# Patient Record
Sex: FEMALE | Race: WHITE | Employment: OTHER | URBAN - METROPOLITAN AREA
[De-identification: names, ages, dates, MRNs, and addresses within clinical notes are randomized per-mention and may not be internally consistent; named-entity substitution may affect disease eponyms.]

---

## 2017-01-11 ENCOUNTER — OFFICE VISIT (OUTPATIENT)
Dept: FAMILY MEDICINE | Facility: CLINIC | Age: 47
End: 2017-01-11

## 2017-01-11 VITALS
DIASTOLIC BLOOD PRESSURE: 68 MMHG | SYSTOLIC BLOOD PRESSURE: 100 MMHG | TEMPERATURE: 98.9 F | BODY MASS INDEX: 19.64 KG/M2 | HEART RATE: 90 BPM | OXYGEN SATURATION: 98 % | WEIGHT: 134 LBS

## 2017-01-11 DIAGNOSIS — J06.9 VIRAL URI: ICD-10-CM

## 2017-01-11 DIAGNOSIS — E53.8 VITAMIN B12 DEFICIENCY (NON ANEMIC): ICD-10-CM

## 2017-01-11 DIAGNOSIS — R53.83 OTHER FATIGUE: ICD-10-CM

## 2017-01-11 DIAGNOSIS — F33.1 MODERATE EPISODE OF RECURRENT MAJOR DEPRESSIVE DISORDER (H): Primary | ICD-10-CM

## 2017-01-11 PROCEDURE — 99214 OFFICE O/P EST MOD 30 MIN: CPT | Performed by: FAMILY MEDICINE

## 2017-01-11 PROCEDURE — 36415 COLL VENOUS BLD VENIPUNCTURE: CPT | Performed by: FAMILY MEDICINE

## 2017-01-11 RX ORDER — CYANOCOBALAMIN 1000 UG/ML
1 INJECTION, SOLUTION INTRAMUSCULAR; SUBCUTANEOUS
Qty: 1 ML | Refills: 11 | OUTPATIENT
Start: 2017-01-11 | End: 2018-01-11

## 2017-01-11 NOTE — PROGRESS NOTES
"Upper respiratory infection  Sick 7 days  Throat pain was initially the biggest problem  gone now  Lost voice,Which happens fairly often when she, It's been gone  She lost her voice which happens frequently with her cold.  It's been hoarse for about a week now.  Slowly getting better she had ear pain on the LEFT which now feels resolved.  No problems on the RIGHT ear she feels that she is not getting better and is about the same as she has been.  She does not feel she is getting worse she does have an inhaler that she's used previously that his albuterol and she's been using that.  It does not seem to be making any difference she is not short of breath    Work-\" A little of this and that\" not around kids and has not been around other sick peo    Ros  fatigue   no fever  pulm has no sob  Nasal green snot thick  Eyes have been crusty      Vitamin supplementation.  She has questions about whether she receive vitamin B12 here    She's been receiving injectable vitamins on a weekly basis at her health spa on a regular basis she tells me for several weeks if not longer.  She has been getting vitamin B12 is one of those and injections and she feels that since she started getting the injection she's had a significant improvement in her mood lability and general ability to manage stressful things.  She expected her energy to improve and it did not.  She would like to start B12 injections on her own.  As she mentioned she mentioned also that she's been getting injectable vitamin C and injectable vitamin D       Depression.  Currently on medications for this    She states that her depression at this point is \"fine\"  She does not want to discuss this today.    Past Medical History   Diagnosis Date     Anxiety      Major depressive disorder, recurrent episode, unspecified      Gastro-oesophageal reflux disease      Past Surgical History   Procedure Laterality Date     Cholecystectomy       Abdomen surgery       tumwaleska sapp     " Breast surgery       augmentation     Tonsillectomy       Laparoscopic hysterectomy supracervical  6/4/2012     Procedure:LAPAROSCOPIC HYSTERECTOMY SUPRACERVICAL; Laparoscopic Supracervical Hysterectomy, revision of naval; Surgeon:GUILHERME NAVAS; Location: OR     Current Outpatient Prescriptions   Medication     cyanocobalamin (VITAMIN B12) 1000 MCG/ML injection     ALPRAZolam (XANAX) 1 MG tablet     LANsoprazole (PREVACID) 15 MG CR capsule     traZODone (DESYREL) 50 MG tablet     traZODone (DESYREL) 100 MG tablet     ALBUTEROL 108 (90 BASE) MCG/ACT inhaler     lidocaine-prilocaine (EMLA) cream     amphetamine-dextroamphetamine (ADDERALL) 10 MG tablet     albuterol (PROAIR HFA, PROVENTIL HFA, VENTOLIN HFA) 108 (90 BASE) MCG/ACT inhaler     ondansetron (ZOFRAN ODT) 4 MG disintegrating tablet     VENTOLIN  (90 BASE) MCG/ACT inhaler     No current facility-administered medications for this visit.     /68 mmHg  Pulse 90  Temp(Src) 98.9  F (37.2  C) (Oral)  Wt 60.782 kg (134 lb)  SpO2 98%  LMP 06/04/2012  Slender woman wearing extremely large sunglasses.  She states that her eyes are sensitive to light all the time.  Eye examination shows mild conjunctival erythema and no exudate no mattering on her lashes.  Pupils are equal round react to light extraocular movements are intact ear examination shows that her LEFT tympanic membrane is possibly distended it is translucent and there is no obvious air-fluid level there certainly is no infection seen.  Compared to her RIGHT tympanic membrane which is slightly dull they are different in appearance landmarks are normal in both ears.  Oropharynx is unremarkable no exudate nasal passages show her nasal mucosa to be mildly red there is no discharge or mucus seen.  Neck is supple without adenopathy breath sounds are clear throughout both lung fields there are no audible wheezing with forced expiration cardiac exam is regular mood and affect she's somewhat  reserved/  or defensive.         ASSESSMENT / PLAN      (J06.9,  B97.89) Viral URI  At this point I don't see any reason to give her an antibiotic.  I did suggest to her that she use nasal irrigation on a regular basis for the next week or so while she is improving.  If she's not getting any better over the next three days I told her that we would give her amoxicillin if she called in requested.  500 mg 3 times a day ×10 days.  I encouraged her to wait as I expect her to gradually get better          (F33.1) Moderate episode of recurrent major depressive disorder (H)  (primary encounter diagnosis)  I believe medications are provided through our office she will come back and discuss her mood with Dr. Lino in the future    (E53.8) Vitamin B12 deficiency (non anemic)???  She has been receiving vitamin B at a health spa on a weekly basis.  She is unable to tell me how much or what type of vitamin   B.  I told her that I'm happy to give her a vitamin B12 injection today but that I think it would be worth having more of a discussion with her regular physician about whether this makes sense on an ongoing basis.  She is pretty adamant that it's been very helpful for mood lability I don't really see a big downside to  keeping her on it but I thought that it would be best if she discussed this with Dr. Lino.  The vitamin B12 level will be checked today and she will return in the future to discuss this with Dr. Lino    Plan: Vitamin B12 and Folate ([facility]),         cyanocobalamin (VITAMIN B12) 1000 MCG/ML         injection, VITAMIN B12 INJ /1000MCG  (** ADD         QUANTITY **), VENOUS COLLECTION          (R53.83) Other fatigue  Multiple possibilities no other labs were run besides the B12 and folate today think she should discuss this with Dr. Lino      >25 minutes spent with patient, greater than 50% spent on discussion/education/planning - as outlined in assessment and plan   Elmo Aranda MD

## 2017-01-11 NOTE — Clinical Note
[facility] [address] [place] [address] [phone]      January 11, 2017      Estelle Gomez  [address]  [facility] [address]              Estelle was in my office today and was ill.             Sincerely,    Jean Aranda M.D.

## 2017-01-11 NOTE — MR AVS SNAPSHOT
"              After Visit Summary   1/11/2017    Estelle Gomez    MRN: [id number]           Patient Information     Date Of Birth          1970        Visit Information        Provider Department      1/11/2017 10:00 AM Jean Villanueva MD [facility]        Today's Diagnoses     Moderate episode of recurrent major depressive disorder (H)    -  1     Vitamin B12 deficiency (non anemic)         Other fatigue         Viral URI           Care Instructions    NASAL IRRIGATION    Helpful when you have a cold to get the mucous out of your nose to help relieve symptoms and prevent sinus infections.    Options     1. Cannister of aerosolized saline. Buy at pharmacy, \"Simply saline\" or generic     2. Nasal saline squeeze bottle. Buy at pharmacy. \"[place] [employer]\"     3. [employer] - is a KIT for nasal irrigation. Useful if CHRONIC sinus issues. Kit  has instructions in it.   Viral Upper Respiratory Illness (Adult)  You have a viral upper respiratory illness (URI), which is another term for the common cold. This illness is contagious during the first few days. It is spread through the air by coughing and sneezing. It may also be spread by direct contact (touching the sick person and then touching your own eyes, nose, or mouth). Frequent handwashing will decrease risk of spread. Most viral illnesses go away within 7 to 10 days with rest and simple home remedies. Sometimes the illness may last for several weeks. Antibiotics will not kill a virus, and they are generally not prescribed for this condition.    Home care    If symptoms are severe, rest at home for the first 2 to 3 days. When you resume activity, don't let yourself get too tired.    Avoid being exposed to cigarette smoke (yours or others ).    You may use acetaminophen or ibuprofen to control pain and fever, unless another medicine was prescribed. (Note: If you have chronic liver or kidney disease, have ever had a stomach ulcer or " gastrointestinal bleeding, or are taking blood-thinning medicines, talk with your healthcare provider before using these medicines.) Aspirin should never be given to anyone under 18 years of age who is ill with a viral infection or fever. It may cause severe liver or brain damage.    Your appetite may be poor, so a light diet is fine. Avoid dehydration by drinking 6 to 8 glasses of fluids per day (water, soft drinks, juices, tea, or soup). Extra fluids will help loosen secretions in the nose and lungs.    Over-the-counter cold medicines will not shorten the length of time you re sick, but they may be helpful for the following symptoms: cough, sore throat, and nasal and sinus congestion. (Note: Do not use decongestants if you have high blood pressure.)  Follow-up care  Follow up with your healthcare provider, or as advised.  When to seek medical advice  Call your healthcare provider right away if any of these occur:    Cough with lots of colored sputum (mucus)    Severe headache; face, neck, or ear pain    Difficulty swallowing due to throat pain    Fever of 100.4 F (38 C)  Call 911, or get immediate medical care  Call emergency services right away if any of these occur:    Chest pain, shortness of breath, wheezing, or difficulty breathing    Coughing up blood    Inability to swallow due to throat pain    3318-5852 [employer]. [address] [place] [address]. All rights reserved. This information is not intended as a substitute for professional medical care. Always follow your healthcare professional's instructions.              Follow-ups after your visit        Who to contact     If you have questions or need follow up information about today's clinic visit or your schedule please contact [facility] directly at [phone].  Normal or non-critical lab and imaging results will be communicated to you by MyChart, letter or phone within 4 business days after the clinic has  "received the results. If you do not hear from us within 7 days, please contact the clinic through [employer] or phone. If you have a critical or abnormal lab result, we will notify you by phone as soon as possible.  Submit refill requests through [employer] or call your pharmacy and they will forward the refill request to us. Please allow 3 business days for your refill to be completed.          Additional Information About Your Visit        en-Gauge Information     [employer]auge lets you send messages to your doctor, view your test results, renew your prescriptions, schedule appointments and more. To sign up, go to [email or web address] . Click on \"Log in\" on the left side of the screen, which will take you to the Welcome page. Then click on \"Sign up Now\" on the right side of the page.     You will be asked to enter the access code listed below, as well as some personal information. Please follow the directions to create your username and password.     Your access code is: [email or web address]  Expires: 2017 10:33 AM     Your access code will  in 90 days. If you need help or a new code, please call your [facility] clinic or [phone].        Care EveryWhere ID     This is your Care EveryWhere ID. This could be used by other organizations to access your [facility] medical records  [id number]        Your Vitals Were     Pulse Temperature Pulse Oximetry Last Period          90 98.9  F (37.2  C) (Oral) 98% 2012         Blood Pressure from Last 3 Encounters:   17 100/68   16 92/62   16 115/70    Weight from Last 3 Encounters:   17 60.782 kg (134 lb)   16 60.782 kg (134 lb)   16 62.596 kg (138 lb)              We Performed the Following     Vitamin B12 and Folate ([facility])          Today's Medication Changes          These changes are accurate as of: 17 10:33 AM.  If you have any questions, ask your nurse or doctor.               Start taking these medicines.        Dose/Directions "    cyanocobalamin 1000 MCG/ML injection   Commonly known as:  VITAMIN B12   Used for:  Vitamin B12 deficiency (non anemic)   Started by:  Jean Villanueva MD        Dose:  1 mL   Inject 1 mL (1,000 mcg) into the muscle every 30 days   Quantity:  1 mL   Refills:  11            Where to get your medicines      Some of these will need a paper prescription and others can be bought over the counter.  Ask your nurse if you have questions.     You don't need a prescription for these medications    - cyanocobalamin 1000 MCG/ML injection             Primary Care Provider Office Phone # Fax #    Brien Lino [phone]       [facility] [address]  [facility] [address]        Thank you!     Thank you for choosing [facility]  for your care. Our goal is always to provide you with excellent care. Hearing back from our patients is one way we can continue to improve our services. Please take a few minutes to complete the written survey that you may receive in the mail after your visit with us. Thank you!             Your Updated Medication List - Protect others around you: Learn how to safely use, store and throw away your medicines at www.disposemymeds.org.          This list is accurate as of: 1/11/17 10:33 AM.  Always use your most recent med list.                   Brand Name Dispense Instructions for use    * albuterol 108 (90 BASE) MCG/ACT Inhaler    PROAIR HFA/PROVENTIL HFA/VENTOLIN HFA    3 Inhaler    Inhale 2 puffs into the lungs every 6 hours as needed for shortness of breath / dyspnea or wheezing       * VENTOLIN  (90 BASE) MCG/ACT Inhaler   Generic drug:  albuterol     18 g    INHALE TWO PUFFS BY MOUTH EVERY SIX HOURS AS NEEDED FOR SHORTNESS OF BREATH       * albuterol 108 (90 BASE) MCG/ACT Inhaler   Generic drug:  albuterol     8.5 Inhaler    INHALE 2 PUFFS BY MOUTH EVERY 6 HOURS AS NEEDED FOR SHORTNESS OF BREATH/DYSPNEA       ALPRAZolam 1 MG tablet    XANAX     120 tablet    TAKE 1 TABLET BY MOUTH 4 TIMES A DAY AS NEEDED       amphetamine-dextroamphetamine 10 MG per tablet    ADDERALL    90 tablet    Take 1 tablet (10 mg) by mouth 3 times daily       cyanocobalamin 1000 MCG/ML injection    VITAMIN B12    1 mL    Inject 1 mL (1,000 mcg) into the muscle every 30 days       LANsoprazole 15 MG CR capsule    PREVACID    180 capsule    TAKE ONE CAPSULE BY MOUTH TWICE DAILY, 30-60 MINUTES BEFORE MEALS       lidocaine-prilocaine cream    EMLA    30 g    Apply to affected area  as needed prior to treatment       ondansetron 4 MG ODT tab    ZOFRAN ODT    60 tablet    Take 1-2 tablets (4-8 mg) by mouth every 8 hours as needed for nausea       * traZODone 50 MG tablet    DESYREL    90 tablet    TAKE ONE TABLET BY MOUTH NIGHTLY AT BEDTIME AS NEEDED FOR SLEEP       * traZODone 100 MG tablet    DESYREL    180 tablet    TAKE ONE TABLET BY MOUTH TWICE DAILY       * Notice:  This list has 5 medication(s) that are the same as other medications prescribed for you. Read the directions carefully, and ask your doctor or other care provider to review them with you.

## 2017-01-11 NOTE — PATIENT INSTRUCTIONS
"NASAL IRRIGATION    Helpful when you have a cold to get the mucous out of your nose to help relieve symptoms and prevent sinus infections.    Options     1. Cannister of aerosolized saline. Buy at pharmacy, \"Simply saline\" or generic     2. Nasal saline squeeze bottle. Buy at pharmacy. \"[employer]\"     3. [employer] - is a KIT for nasal irrigation. Useful if CHRONIC sinus issues. Kit  has instructions in it.   Viral Upper Respiratory Illness (Adult)  You have a viral upper respiratory illness (URI), which is another term for the common cold. This illness is contagious during the first few days. It is spread through the air by coughing and sneezing. It may also be spread by direct contact (touching the sick person and then touching your own eyes, nose, or mouth). Frequent handwashing will decrease risk of spread. Most viral illnesses go away within 7 to 10 days with rest and simple home remedies. Sometimes the illness may last for several weeks. Antibiotics will not kill a virus, and they are generally not prescribed for this condition.    Home care    If symptoms are severe, rest at home for the first 2 to 3 days. When you resume activity, don't let yourself get too tired.    Avoid being exposed to cigarette smoke (yours or others ).    You may use acetaminophen or ibuprofen to control pain and fever, unless another medicine was prescribed. (Note: If you have chronic liver or kidney disease, have ever had a stomach ulcer or gastrointestinal bleeding, or are taking blood-thinning medicines, talk with your healthcare provider before using these medicines.) Aspirin should never be given to anyone under 18 years of age who is ill with a viral infection or fever. It may cause severe liver or brain damage.    Your appetite may be poor, so a light diet is fine. Avoid dehydration by drinking 6 to 8 glasses of fluids per day (water, soft drinks, juices, tea, or soup). Extra fluids will help loosen secretions in the " nose and lungs.    Over-the-counter cold medicines will not shorten the length of time you re sick, but they may be helpful for the following symptoms: cough, sore throat, and nasal and sinus congestion. (Note: Do not use decongestants if you have high blood pressure.)  Follow-up care  Follow up with your healthcare provider, or as advised.  When to seek medical advice  Call your healthcare provider right away if any of these occur:    Cough with lots of colored sputum (mucus)    Severe headache; face, neck, or ear pain    Difficulty swallowing due to throat pain    Fever of 100.4 F (38 C)  Call 911, or get immediate medical care  Call emergency services right away if any of these occur:    Chest pain, shortness of breath, wheezing, or difficulty breathing    Coughing up blood    Inability to swallow due to throat pain    4592-2100 [employer]. [address] [place] [address], [place] [address]. All rights reserved. This information is not intended as a substitute for professional medical care. Always follow your healthcare professional's instructions.

## 2017-01-11 NOTE — NURSING NOTE
The following medication was given:     MEDICATION: Vitamin B12  1000mcg  ROUTE: IM  SITE: Deltoid - Right  DOSE: 1.0ml  LOT #: [id number]  :  [employer]  EXPIRATION DATE:  11/2017  NDC#: 1505-0201-43  Jaqui Fountian MA January 11, 2017 10:43 AM    Kendrikc blood first for B12/folate levels than administered B12 injection

## 2017-01-11 NOTE — Clinical Note
[facility] [address] [place]  [address]  Phone: [phone]    January 13, 2017      Estelle Gomez  [address]  [facility] [address]              Dear Estelle,    The results from your recent visit showed Your recent vitamin B-12 level was greater than 2000.  This is well above the normal upper limit.    This is not a surprise given that you have been receiving vitamin B12.  It is not a problem.   Also I noticed that two years ago to had a vitamin B-12 level done and it was well in the normal range.        Sincerely,     Jean Aranda M.D.    Results for orders placed or performed in visit on 01/11/17   Vitamin B12 and Folate ([facility])   Result Value Ref Range    Vitamin B12 >1999 (H) 211 - 946 pg/mL    Folate 10.6 >3.0 ng/mL    Narrative    Performed at:  01 - [facility] [address], [place], [place]  [id number]  : Johnathan Garcia MD, Phone:  [phone]

## 2017-01-12 LAB
FOLATE: 10.6 NG/ML
VIT B12 SERPL-MCNC: >1999 PG/ML (ref 211–946)

## 2017-01-13 ENCOUNTER — TELEPHONE (OUTPATIENT)
Dept: FAMILY MEDICINE | Facility: CLINIC | Age: 47
End: 2017-01-13

## 2017-01-13 DIAGNOSIS — J06.9 URI (UPPER RESPIRATORY INFECTION): Primary | ICD-10-CM

## 2017-01-13 RX ORDER — AZITHROMYCIN 250 MG/1
TABLET, FILM COATED ORAL
Qty: 6 TABLET | Refills: 0 | Status: SHIPPED | OUTPATIENT
Start: 2017-01-13 | End: 2017-01-25

## 2017-01-13 NOTE — TELEPHONE ENCOUNTER
Patient called and states she was seen 1/11/17 for a cold and told if she was no better in a few days to call to get antibiotic.  She does not feel any better.  Talked with Dr. Brien Lino and he said to call out ANGELICA jain.  Patient informed and rx to pharmacy.

## 2017-01-25 ENCOUNTER — OFFICE VISIT (OUTPATIENT)
Dept: FAMILY MEDICINE | Facility: CLINIC | Age: 47
End: 2017-01-25

## 2017-01-25 VITALS
HEIGHT: 70 IN | OXYGEN SATURATION: 97 % | HEART RATE: 93 BPM | SYSTOLIC BLOOD PRESSURE: 100 MMHG | DIASTOLIC BLOOD PRESSURE: 60 MMHG | WEIGHT: 133 LBS | BODY MASS INDEX: 19.04 KG/M2

## 2017-01-25 DIAGNOSIS — J45.20 MILD INTERMITTENT ASTHMA WITHOUT COMPLICATION: ICD-10-CM

## 2017-01-25 DIAGNOSIS — R11.0 NAUSEA: ICD-10-CM

## 2017-01-25 DIAGNOSIS — Z71.6 ENCOUNTER FOR SMOKING CESSATION COUNSELING: ICD-10-CM

## 2017-01-25 DIAGNOSIS — F98.8 ADD (ATTENTION DEFICIT DISORDER): Primary | ICD-10-CM

## 2017-01-25 DIAGNOSIS — Z76.0 ENCOUNTER FOR MEDICATION REFILL: ICD-10-CM

## 2017-01-25 DIAGNOSIS — F33.41 RECURRENT MAJOR DEPRESSIVE DISORDER, IN PARTIAL REMISSION (H): ICD-10-CM

## 2017-01-25 PROBLEM — Z02.89 PAIN MEDICATION AGREEMENT SIGNED: Status: ACTIVE | Noted: 2017-01-25

## 2017-01-25 PROCEDURE — 99214 OFFICE O/P EST MOD 30 MIN: CPT | Performed by: FAMILY MEDICINE

## 2017-01-25 RX ORDER — TRAZODONE HYDROCHLORIDE 50 MG/1
TABLET, FILM COATED ORAL
Qty: 90 TABLET | Refills: 2 | Status: SHIPPED | OUTPATIENT
Start: 2017-01-25 | End: 2017-10-25

## 2017-01-25 RX ORDER — TRAZODONE HYDROCHLORIDE 100 MG/1
100 TABLET ORAL 2 TIMES DAILY
Qty: 180 TABLET | Refills: 2 | Status: SHIPPED | OUTPATIENT
Start: 2017-01-25 | End: 2017-10-25

## 2017-01-25 RX ORDER — DEXTROAMPHETAMINE SACCHARATE, AMPHETAMINE ASPARTATE, DEXTROAMPHETAMINE SULFATE AND AMPHETAMINE SULFATE 2.5; 2.5; 2.5; 2.5 MG/1; MG/1; MG/1; MG/1
10 TABLET ORAL 3 TIMES DAILY
Qty: 90 TABLET | Refills: 0 | Status: SHIPPED | OUTPATIENT
Start: 2017-02-24 | End: 2017-03-26

## 2017-01-25 RX ORDER — ALPRAZOLAM 1 MG
TABLET ORAL
Qty: 120 TABLET | Refills: 0 | Status: SHIPPED | OUTPATIENT
Start: 2017-01-25 | End: 2017-02-13

## 2017-01-25 RX ORDER — ALBUTEROL SULFATE 90 UG/1
2 AEROSOL, METERED RESPIRATORY (INHALATION) EVERY 6 HOURS PRN
Qty: 3 INHALER | Refills: 1 | Status: SHIPPED | OUTPATIENT
Start: 2017-01-25 | End: 2021-04-08

## 2017-01-25 RX ORDER — DEXTROAMPHETAMINE SACCHARATE, AMPHETAMINE ASPARTATE, DEXTROAMPHETAMINE SULFATE AND AMPHETAMINE SULFATE 2.5; 2.5; 2.5; 2.5 MG/1; MG/1; MG/1; MG/1
10 TABLET ORAL 3 TIMES DAILY
Qty: 90 TABLET | Refills: 0 | Status: SHIPPED | OUTPATIENT
Start: 2017-01-25 | End: 2018-07-30

## 2017-01-25 RX ORDER — IBUPROFEN 200 MG
TABLET ORAL
Qty: 50 EACH | Refills: 0 | Status: SHIPPED | OUTPATIENT
Start: 2017-01-25 | End: 2018-08-13

## 2017-01-25 RX ORDER — DEXTROAMPHETAMINE SACCHARATE, AMPHETAMINE ASPARTATE, DEXTROAMPHETAMINE SULFATE AND AMPHETAMINE SULFATE 2.5; 2.5; 2.5; 2.5 MG/1; MG/1; MG/1; MG/1
10 TABLET ORAL 3 TIMES DAILY
Qty: 90 TABLET | Refills: 0 | Status: SHIPPED | OUTPATIENT
Start: 2017-03-26 | End: 2017-04-25

## 2017-01-25 RX ORDER — ONDANSETRON 4 MG/1
4-8 TABLET, ORALLY DISINTEGRATING ORAL EVERY 8 HOURS PRN
Qty: 60 TABLET | Refills: 1 | Status: SHIPPED | OUTPATIENT
Start: 2017-01-25 | End: 2017-03-13

## 2017-01-25 RX ORDER — CYANOCOBALAMIN 1000 UG/ML
1 INJECTION, SOLUTION INTRAMUSCULAR; SUBCUTANEOUS
Qty: 25 ML | Refills: 1 | OUTPATIENT
Start: 2017-01-25 | End: 2018-01-25

## 2017-01-25 NOTE — Clinical Note
[facility]    01/25/2017    Patient: Estelle Gomez  YOB: 1970  Medical Record Number: [id number]                                                                  Controlled Substance Agreement  I understand that my care provider has prescribed controlled substances (narcotics, tranquilizers, and/or stimulants) to help manage my condition(s).  I am taking this medicine to help me function or work.  I know that this is strong medicine.  It could have serious side effects and even cause a dependency on the drug.  If I stop these medicines suddenly, I could have severe withdrawal symptoms.    The risks, benefits, and side effects of these medication(s) were explained to me.  I agree that:  1. I will take part in other treatments as advised by my provider.  This may be psychiatry or counseling, physical therapy, behavioral therapy, group treatment, or a referral to a pain clinic.  I will reduce or stop my medicine when my provider tells me to do so.   2. I will take my medicines as prescribed.  I will not change the dose or schedule unless my provider tells me to.  There will be no refills if I  run out early.   I may be contacted at any time without warning and asked to complete a drug test or pill count.   3. I will keep all my appointments at the clinic.  If I miss appointments or fail to follow instructions, my provider may stop my medicine.  4. I will not ask other providers to prescribe controlled substances. And I will not accept controlled substances from other people. If I need another prescribed controlled substance for a new reason, I will notify my provider within one business day.  5. If I enroll in the [place] Medical Marijuana program, I will tell my provider.  I will also sign an agreement to share my medical records with my provider.  6. I will use one pharmacy to fill all of my controlled substance prescriptions.  If my prescription is mailed to my pharmacy, it may  take 5 to 7 days for my medicine to be ready.  7. I understand that my provider, clinic care team, and pharmacy can track controlled substance prescriptions from other providers through a central database (prescription monitoring program).  8. I will bring in my list of medications (or my medicine bottles) each time I come to the clinic.  REV- 04/2016                                                                                                                                            Page 1 of 2      [facility]    01/25/2017    Patient: Estelle Gomez  YOB: 1970  Medical Record Number: [id number]    9. Refills of controlled substances will be made only during office hours.  It is up to me to make sure that I do not run out of my medicines on weekends or holidays.    10. I am responsible for my prescriptions.  If the medicine is lost or stolen, it will not be replaced.   I also agree not to share these medicines with anyone.  11. I agree to not use ANY illegal or recreational drugs.  This includes marijuana, cocaine, bath salts or other drugs.  I agree not to use alcohol unless my provider says I may.  I agree to give urine samples whenever asked.  If I fail to give a urine sample, the provider may stop my medicine.     12. I will tell my nurse or provider right away if I become pregnant or have a new medical problem treated outside of [facility].  13. I understand that this medicine can affect my thinking and judgment.  It may be unsafe for me to drive, use machinery and do dangerous tasks.  I will not do any of these things until I know how the medicine affects me.  If my dose changes, I will wait to see how it affects me.  I will contact my provider if I have concerns about medicine side effects.  I understand that if I do not follow any of the conditions above, my prescriptions or treatment may be stopped.    I agree that my provider, clinic care team, and pharmacy may  work with any city, state or federal law enforcement agency that investigates the misuse, sale, or other diversion of my controlled medicine. I will allow my provider to discuss my care with or share a copy of this agreement with any other treating provider, pharmacy or emergency room where I receive care.  I agree to give up (waive) any right of privacy or confidentiality with respect to these authorizations.   I have read this agreement and have asked questions about anything I did not understand.   ___________________________________    ___________________________  Patient Signature                                                           Date and Time  ___________________________________     ____________________________  Witness                                                                            Date and Time  ___________________________________  Brien Lino MD  REV-  04/2016                                                                                                                                                                 Page 2 of 2  {Controlled Substance Agreement (CSA) Patient Instructions:[id number]}

## 2017-01-25 NOTE — PROGRESS NOTES
Received call from patient asking about prescription for b12.  It was not at her pharmacy.  Upon review it was noted that it was entered as she was given the shot, not as a refill.

## 2017-01-25 NOTE — Clinical Note
My Depression Action Plan  Name: Estelle Gomez   Date of Birth 1970  Date: 1/25/2017    My doctor: Brien Lino   My clinic: [facility] [address] [place] [address] [phone]          GREEN    ZONE   Good Control    What it looks like:     Things are going generally well. You have normal up s and down s. You may even feel depressed from time to time, but bad moods usually last less than a day.   What you need to do:  1. Continue to care for yourself (see self care plan)  2. Check your depression survival kit and update it as needed  3. Follow your physician s recommendations including any medication.  4. Do not stop taking medication unless you consult with your physician first.           YELLOW         ZONE Getting Worse    What it looks like:     Depression is starting to interfere with your life.     It may be hard to get out of bed; you may be starting to isolate yourself from others.    Symptoms of depression are starting to last most all day and this has happened for several days.     You may have suicidal thoughts but they are not constant.   What you need to do:     1. Call your care team, your response to treatment will improve if you keep your care team informed of your progress. Yellow periods are signs an adjustment may need to be made.     2. Continue your self-care, even if you have to fake it!    3. Talk to someone in your support network    4. Open up your depression survival kit           RED    ZONE Medical Alert - Get Help    What it looks like:     Depression is seriously interfering with your life.     You may experience these or other symptoms: You can t get out of bed most days, can t work or engage in other necessary activities, you have trouble taking care of basic hygiene, or basic responsibilities, thoughts of suicide or death that will not go away, self-injurious behavior.     What you need to do:  1. Call your care team  and request a same-day appointment. If they are not available (weekends or after hours) call your local crisis line, emergency room or 911.      Electronically signed by: Maria Teresa Aguirre, January 25, 2017    Depression Self Care Plan / Survival Kit    Self-Care for Depression  Here s the deal. Your body and mind are really not as separate as most people think.  What you do and think affects how you feel and how you feel influences what you do and think. This means if you do things that people who feel good do, it will help you feel better.  Sometimes this is all it takes.  There is also a place for medication and therapy depending on how severe your depression is, so be sure to consult with your medical provider and/ or Behavioral Health Consultant if your symptoms are worsening or not improving.     In order to better manage my stress, I will:    Exercise  Get some form of exercise, every day. This will help reduce pain and release endorphins, the  feel good  chemicals in your brain. This is almost as good as taking antidepressants!  This is not the same as joining a gym and then never going! (they count on that by the way ) It can be as simple as just going for a walk or doing some gardening, anything that will get you moving.      Hygiene   Maintain good hygiene (Get out of bed in the morning, Make your bed, Brush your teeth, Take a shower, and Get dressed like you were going to work, even if you are unemployed).  If your clothes don't fit try to get ones that do.    Diet  I will strive to eat foods that are good for me, drink plenty of water, and avoid excessive sugar, caffeine, alcohol, and other mood-altering substances.  Some foods that are helpful in depression are: complex carbohydrates, B vitamins, flaxseed, fish or fish oil, fresh fruits and vegetables.    Psychotherapy  I agree to participate in Individual Therapy (if recommended).    Medication  If prescribed medications, I agree to take them.  Missing  doses can result in serious side effects.  I understand that drinking alcohol, or other illicit drug use, may cause potential side effects.  I will not stop my medication abruptly without first discussing it with my provider.    Staying Connected With Others  I will stay in touch with my friends, family members, and my primary care provider/team.    Use your imagination  Be creative.  We all have a creative side; it doesn t matter if it s oil painting, sand castles, or mud pies! This will also kick up the endorphins.    Witness Beauty  (AKA stop and smell the roses) Take a look outside, even in mid-winter. Notice colors, textures. Watch the squirrels and birds.     Service to others  Be of service to others.  There is always someone else in need.  By helping others we can  get out of ourselves  and remember the really important things.  This also provides opportunities for practicing all the other parts of the program.    Humor  Laugh and be silly!  Adjust your TV habits for less news and crime-drama and more comedy.    Control your stress  Try breathing deep, massage therapy, biofeedback, and meditation. Find time to relax each day.     My support system    Clinic Contact:  Phone number:    Contact 1:  Phone number:    Contact 2:  Phone number:    [employer]/:  Phone number:    Therapist:  Phone number:    Local crisis center:    Phone number:    Other community support:  Phone number:

## 2017-01-25 NOTE — PROGRESS NOTES
Problem(s) Oriented visit        SUBJECTIVE:                                                    Estelle Gomez is a 46 year old female who presents to clinic today for the following health issues :    Med refills    1. ADD (attention deficit disorder)  Longterm, takes Adderall intermittently  Needs to sign controlled substance agreement      2. Recurrent major depressive disorder, in partial remission (H)  Long history, but feeling quite good since getting weekly B12 1000U injections at her spa.   She would like to do this at home. She has been on high dose of trazodone for years, and the combination seems best for her.  3. Nausea  Zofran prn. She is taking OTC ranitidine with Prevacid, but prevacid no longer covered.      4. Mild intermittent asthma without complication  Doing well, needs albuterol refill  6. Encounter for smoking cessation counseling  Requests chantix again      Problem list, Medication list, Allergies, and Medical/Social/Surgical histories reviewed in [facility] and updated as appropriate.   Additional history: as documented    ROS:  5 point ROS completed and negative except noted above, including Gen, CV, Resp, GI, MS    Histories:   Patient Active Problem List   Diagnosis     Depression     Major depressive disorder, recurrent episode (H)     Insomnia     Generalized anxiety disorder     ADD (attention deficit disorder)     Chronic bronchitis (H)     Pain medication agreement signed     Past Surgical History   Procedure Laterality Date     Cholecystectomy       Abdomen surgery       tummy tuck     Breast surgery       augmentation     Tonsillectomy       Laparoscopic hysterectomy supracervical  6/4/2012     Procedure:LAPAROSCOPIC HYSTERECTOMY SUPRACERVICAL; Laparoscopic Supracervical Hysterectomy, revision of naval; Surgeon:GUILHERME NAVAS; Location: OR       Social History   Substance Use Topics     Smoking status: Current Every Day Smoker     Types: Cigarettes     Smokeless tobacco:  "Never Used      Comment: 3 cig./ day     Alcohol Use: Yes      Comment: 1-2 drinks per month     No family history on file.        OBJECTIVE:                                                    /60 mmHg  Pulse 93  Ht 1.765 m (5' 9.5\")  Wt 60.328 kg (133 lb)  BMI 19.37 kg/m2  SpO2 97%  LMP 06/04/2012  Body mass index is 19.37 kg/(m^2).   APPEARANCE: = Relaxed and in no distress  Ears/Nose = External structures and Nares have usual shape and form  Ear canals and TM's = Canals are not inflammed and have none or little wax and the drums are not injected and have a light reflex   Lips/Teeth/Gums = No lesions seen, good dentition, and gums seem healthy  Neck = No anterior or posterior adenopathy appreciated.  Thyroid = Not enlarged and no masses felt  Resp effort = Calm regular breathing  Breath Sounds = Good air movement with no rales or rhonchi in any lung fields  Heart Rate, Rythym, & sounds (no Murm)  = Regular rate and rythym with no S3, S4, or murmer appreciated.  Recent/Remote Memory = Alert and Oriented x 3  NEURO = CN II-XII are tested and no deficits found  Mood/Affect = Cooperative and interested, she is in very good spirits     ASSESSMENT/PLAN:                                                        Estelle was seen today for recheck medication, nicotine dependence and health maintenance.    Diagnoses and all orders for this visit:    ADD (attention deficit disorder)  -     amphetamine-dextroamphetamine (ADDERALL) 10 MG per tablet; Take 1 tablet (10 mg) by mouth 3 times daily  -     amphetamine-dextroamphetamine (ADDERALL) 10 MG per tablet; Take 1 tablet (10 mg) by mouth 3 times daily  -     amphetamine-dextroamphetamine (ADDERALL) 10 MG per tablet; Take 1 tablet (10 mg) by mouth 3 times daily    Encounter for medication refill  -     ALPRAZolam (XANAX) 1 MG tablet; TAKE 1 TABLET BY MOUTH 4 TIMES A DAY AS NEEDED  -     traZODone (DESYREL) 50 MG tablet; TAKE ONE TABLET BY MOUTH NIGHTLY AT BEDTIME AS " "NEEDED FOR SLEEP  -     traZODone (DESYREL) 100 MG tablet; Take 1 tablet (100 mg) by mouth 2 times daily    Recurrent major depressive disorder, in partial remission (H)  -     cyanocobalamin (VITAMIN B12) 1000 MCG/ML injection; Inject 1 mL (1,000 mcg) into the muscle every 7 days  -     Insulin Syringe (BD INSULIN SYRINGE) 29G X 1/2\" 1 ML MISC; Use one syringe weekly for B12 injection.    Nausea  -     ondansetron (ZOFRAN ODT) 4 MG ODT tab; Take 1-2 tablets (4-8 mg) by mouth every 8 hours as needed for nausea  -     omeprazole (PRILOSEC) 20 MG CR capsule; Take 1 capsule (20 mg) by mouth 2 times daily    Mild intermittent asthma without complication  -     albuterol (PROAIR HFA/PROVENTIL HFA/VENTOLIN HFA) 108 (90 BASE) MCG/ACT Inhaler; Inhale 2 puffs into the lungs every 6 hours as needed for shortness of breath / dyspnea or wheezing    Encounter for smoking cessation counseling  -     varenicline (CHANTIX STARTING MONTH PAK) 0.5 MG X 11 & 1 MG X 42 tablet; Take 0.5 mg tab daily for 3 days, then 0.5 mg tab twice daily for 4 days, then 1 mg twice daily.    Other orders  -     DEPRESSION ACTION PLAN (DAP)    she is on a lot of psychotropics, but it seems to work for her. No clear B12 deficiency, but I think weekly of semimonthly injections would be safe.    suggest continued good lifestyle. Due for PAP.     The following health maintenance items are reviewed in Epic and correct as of today:  Health Maintenance   Topic Date Due     PAP SCREENING Q3 YR (SYSTEM ASSIGNED)  05/05/1993     PHQ-9 Q6 MONTHS (NO INBASKET)  08/27/2013     INFLUENZA VACCINE (SYSTEM ASSIGNED)  09/01/2016     DEPRESSION ACTION PLAN Q1 YR (NO INBASKET)  01/25/2018     TETANUS IMMUNIZATION (SYSTEM ASSIGNED)  02/27/2023       Brien Lino MD  [facility]  [phone]    For any issues my office # is [phone]        "

## 2017-02-02 ENCOUNTER — TELEPHONE (OUTPATIENT)
Dept: FAMILY MEDICINE | Facility: CLINIC | Age: 47
End: 2017-02-02

## 2017-02-02 NOTE — TELEPHONE ENCOUNTER
Received prior authorization approval for Adderall.  The pharmacy and the patient have been informed.  Effective 1/26/17 to 1/26/18.    Suleiman Garcia,   [facility]  [phone]

## 2017-02-09 ENCOUNTER — TELEPHONE (OUTPATIENT)
Dept: FAMILY MEDICINE | Facility: CLINIC | Age: 47
End: 2017-02-09

## 2017-02-09 DIAGNOSIS — R11.0 NAUSEA: Primary | ICD-10-CM

## 2017-02-09 NOTE — TELEPHONE ENCOUNTER
Received prior authorization approval for alprazolam, effective 2/7/17 to 2/7/18.  The pharmacy and the patient have been informed.    Suleiman Garcia,   [facility]  [phone]

## 2017-02-13 DIAGNOSIS — Z76.0 ENCOUNTER FOR MEDICATION REFILL: ICD-10-CM

## 2017-02-13 RX ORDER — ALPRAZOLAM 1 MG
TABLET ORAL
Qty: 120 TABLET | Refills: 0 | Status: SHIPPED | OUTPATIENT
Start: 2017-02-13 | End: 2017-03-28

## 2017-03-08 RX ORDER — ONDANSETRON 4 MG/1
4 TABLET, FILM COATED ORAL EVERY 8 HOURS PRN
Qty: 180 TABLET | Refills: 1 | Status: SHIPPED | OUTPATIENT
Start: 2017-03-08 | End: 2018-07-30

## 2017-03-13 ENCOUNTER — TELEPHONE (OUTPATIENT)
Dept: FAMILY MEDICINE | Facility: CLINIC | Age: 47
End: 2017-03-13

## 2017-03-13 NOTE — TELEPHONE ENCOUNTER
Received fax from [facility] pharmacy stating patients rx's for Omeprazole and Ondansetron ODT needs  PA.  Submitted PA's to [facility] pharmacy.  Omeprazole came back approved.  Approval dates 02/22/2017-02/22/2018.   Ondansetron ODT came back denied.   Sent through as plain Ondansetron and got approved.  Patient and pharmacy know of approval.

## 2017-03-28 DIAGNOSIS — Z76.0 ENCOUNTER FOR MEDICATION REFILL: ICD-10-CM

## 2017-03-28 RX ORDER — ALPRAZOLAM 1 MG
TABLET ORAL
Qty: 120 TABLET | Refills: 0 | Status: SHIPPED | OUTPATIENT
Start: 2017-03-28 | End: 2017-06-02

## 2017-06-02 DIAGNOSIS — Z76.0 ENCOUNTER FOR MEDICATION REFILL: ICD-10-CM

## 2017-06-02 RX ORDER — ALPRAZOLAM 1 MG
TABLET ORAL
Qty: 120 TABLET | Refills: 0 | Status: SHIPPED | OUTPATIENT
Start: 2017-06-02 | End: 2017-07-11

## 2017-06-02 NOTE — TELEPHONE ENCOUNTER
Pending Prescriptions:                       Disp   Refills    ALPRAZolam (XANAX) 1 MG tablet [Pharmacy *120 ta*0            Sig: TAKE 1 TABLET BY MOUTH FOUR TIMES DAILY AS NEEDED    Last OV 1/25/17  TSH, CBC 4/22/16  CMP 9/22/14

## 2017-06-10 ENCOUNTER — HEALTH MAINTENANCE LETTER (OUTPATIENT)
Age: 47
End: 2017-06-10

## 2017-07-11 DIAGNOSIS — Z76.0 ENCOUNTER FOR MEDICATION REFILL: ICD-10-CM

## 2017-07-11 RX ORDER — ALPRAZOLAM 1 MG
TABLET ORAL
Qty: 120 TABLET | Refills: 0 | Status: SHIPPED | OUTPATIENT
Start: 2017-07-11 | End: 2017-08-16

## 2017-07-11 NOTE — TELEPHONE ENCOUNTER
Pending Prescriptions:                       Disp   Refills    ALPRAZolam (XANAX) 1 MG tablet [Pharmacy M*120 ta*0        Sig: TAKE 1 TABLET BY MOUTH FOUR TIMES DAILY AS NEEDED    Last OV 1/25/17  TSH, CBC  4/22/16  CMP 9/22/14

## 2017-08-16 DIAGNOSIS — Z76.0 ENCOUNTER FOR MEDICATION REFILL: ICD-10-CM

## 2017-08-16 RX ORDER — ALPRAZOLAM 1 MG
TABLET ORAL
Qty: 120 TABLET | Refills: 0 | Status: SHIPPED | OUTPATIENT
Start: 2017-08-16 | End: 2017-09-28

## 2017-08-16 NOTE — TELEPHONE ENCOUNTER
Pending Prescriptions:                       Disp   Refills    ALPRAZolam (XANAX) 1 MG tablet [Pharmacy M*120 ta*0        Sig: TAKE 1 TABLET BY MOUTH 4 TIMES DAILY AS NEEDED    Last OV 1/25/17  Patient need CPX   TSH, CBC 4/22/16  CMP 9/22/14

## 2017-08-23 ENCOUNTER — OFFICE VISIT (OUTPATIENT)
Dept: FAMILY MEDICINE | Facility: CLINIC | Age: 47
End: 2017-08-23

## 2017-08-23 VITALS
DIASTOLIC BLOOD PRESSURE: 74 MMHG | BODY MASS INDEX: 20.96 KG/M2 | SYSTOLIC BLOOD PRESSURE: 112 MMHG | OXYGEN SATURATION: 98 % | RESPIRATION RATE: 20 BRPM | TEMPERATURE: 99.3 F | WEIGHT: 144 LBS | HEART RATE: 87 BPM

## 2017-08-23 DIAGNOSIS — R35.0 URINARY FREQUENCY: Primary | ICD-10-CM

## 2017-08-23 DIAGNOSIS — R53.83 FATIGUE, UNSPECIFIED TYPE: ICD-10-CM

## 2017-08-23 DIAGNOSIS — R10.2 PELVIC PAIN: ICD-10-CM

## 2017-08-23 LAB
% GRANULOCYTES: 68.4 % (ref 42.2–75.2)
BACTERIA URINE: 0
BILIRUB UR QL STRIP: 0
BLOOD URINE DIP: ABNORMAL
CASTS/LPF: 0
COLOR UR: YELLOW
CRYSTAL URINE: 0
EPITHELIAL CELLS - QUEST: ABNORMAL
GLUCOSE UR STRIP-MCNC: 0 MG/DL
HCT VFR BLD AUTO: 45.3 % (ref 35–46)
HEMOGLOBIN: 15 G/DL (ref 11.8–15.5)
KETONES UR QL STRIP: ABNORMAL
LEUKOCYTE ESTERASE URINE DIP: 0
LYMPHOCYTES NFR BLD AUTO: 25.1 % (ref 20.5–51.1)
MCH RBC QN AUTO: 31.3 PG (ref 27–31)
MCHC RBC AUTO-ENTMCNC: 33.2 G/DL (ref 33–37)
MCV RBC AUTO: 94.2 FL (ref 80–100)
MONOCYTES NFR BLD AUTO: 6.5 % (ref 1.7–9.3)
MUCOUS URINE: 0
NITRITE UR QL STRIP: ABNORMAL
PH UR STRIP: 5.5 PH (ref 5–9)
PLATELET # BLD AUTO: 282 K/UL (ref 140–450)
PROT UR QL: 0 MG/DL (ref ?–0.01)
RBC # BLD AUTO: 4.8 X10/CMM (ref 3.7–5.2)
RBC URINE: ABNORMAL (ref 0–3)
SP GR UR STRIP: 1.02 (ref 1–1.02)
UROBILINOGEN UR QL STRIP: 0.2 EU/DL (ref 0.2–1)
WBC # BLD AUTO: 7.4 X10/CMM (ref 3.8–11)
WBC URINE: 0 (ref 0–3)

## 2017-08-23 PROCEDURE — 36415 COLL VENOUS BLD VENIPUNCTURE: CPT | Performed by: FAMILY MEDICINE

## 2017-08-23 PROCEDURE — 99214 OFFICE O/P EST MOD 30 MIN: CPT | Performed by: FAMILY MEDICINE

## 2017-08-23 PROCEDURE — 81003 URINALYSIS AUTO W/O SCOPE: CPT | Performed by: FAMILY MEDICINE

## 2017-08-23 PROCEDURE — 85025 COMPLETE CBC W/AUTO DIFF WBC: CPT | Performed by: FAMILY MEDICINE

## 2017-08-23 NOTE — PROGRESS NOTES
Problem(s) Oriented visit        SUBJECTIVE:                                                    Estelle Gomez is a 47 year old female who presents to clinic today for frequency and urgency of urination as well as some low grade fever to 99.6F. She feels quite fatigued. She chronically has some low back pain, unchanged. She has five children, all born vaginally. She has her ovaries but is s/p supracervical hysterectomy. She denies any vaginal discharge. She has no new sexual partners. She has IBS, constipation dominant.       Problem list, Medication list, Allergies, and Medical/Social/Surgical histories reviewed in [facility] and updated as appropriate.   Additional history: as documented    ROS:  5 point ROS completed and negative except noted above, including Gen, CV, Resp, GI, MS      Histories:   Patient Active Problem List   Diagnosis     Depression     Major depressive disorder, recurrent episode (H)     Insomnia     Generalized anxiety disorder     ADD (attention deficit disorder)     Chronic bronchitis (H)     Pain medication agreement signed     Past Surgical History:   Procedure Laterality Date     ABDOMEN SURGERY      tummy tuck     BREAST SURGERY      augmentation     CHOLECYSTECTOMY       LAPAROSCOPIC HYSTERECTOMY SUPRACERVICAL  6/4/2012    Procedure:LAPAROSCOPIC HYSTERECTOMY SUPRACERVICAL; Laparoscopic Supracervical Hysterectomy, revision of naval; Surgeon:GUILHERME NAVAS; Location:[facility] OR     TONSILLECTOMY         Social History   Substance Use Topics     Smoking status: Current Every Day Smoker     Types: Cigarettes     Smokeless tobacco: Never Used      Comment: 3 cig./ day- tried chantix     Alcohol use Yes      Comment: 1-2 drinks per month     No family history on file.        OBJECTIVE:                                                    /74  Pulse 87  Temp 99.3  F (37.4  C) (Oral)  Resp 20  Wt 65.3 kg (144 lb)  LMP 06/04/2012  SpO2 98%  BMI 20.96 kg/m2  Body mass index is  20.96 kg/(m^2).   GENERAL APPEARANCE: Alert, no acute distress  NECK: No adenopathy,masses or thyromegaly  ABDOMEN: soft, no organomegaly or masses, there is tenderness where stool is palpated in the LLQ   (female): normal external genitalia, no right sided tenderness or mass. THere is left sided adnexal tenderness, but without rebound or guarding.   LYMPHATICS: No cervical, supraclavicular or inguinal adenopathy  MS: extremities normal, no peripheral edema  NEURO: Alert, oriented, speech and mentation normal  PSYCH: mentation appears normal, affect and mood normal   Labs Resulted Today:   Results for orders placed or performed in visit on 08/23/17   Urinalysis w/reflex protein, bili ([facility])   Result Value Ref Range    Color Urine Yellow     pH Urine 5.5 5 - 9 pH    Specific Gravity Urine 1.020 1.005 - 1.025    Protein Urine 0 0.01 mg/dL    Glucose Urine 0     Ketones Urine Trace (A)     Leukocyte Esterase Urine 0     Blood Urine Trace (A)     Nitrite Urine Neg NEG    Bilirubin Urine Dip 0     Urobilinogen Urine 0.2 0.2 - 1.0 EU/dL    WBC Urine 0 0 - 3    RBC Urine 0-3 0 - 3    Epithelial Cells Rare     Crystal Urine 0     Bacteria Urine 0     Mucous Urine 0     Casts/LPF 0    CBC with Diff/Plt ([facility])   Result Value Ref Range    WBC x10/cmm 7.4 3.8 - 11.0 x10/cmm    % Lymphocytes 25.1 20.5 - 51.1 %    % Monocytes 6.5 1.7 - 9.3 %    % Granulocytes 68.4 42.2 - 75.2 %    RBC x10/cmm 4.80 3.7 - 5.2 x10/cmm    Hemoglobin 15.0 11.8 - 15.5 g/dl    Hematocrit 45.3 35 - 46 %    MCV 94.2 80 - 100 fL    MCH 31.3 (A) 27.0 - 31.0 pg    MCHC 33.2 33.0 - 37.0 g/dL    Platelet Count 282 140 - 450 K/uL     ASSESSMENT/PLAN:                                                        Estelle was seen today for recheck medication, urinary problem and sweats.    Diagnoses and all orders for this visit:    Urinary frequency  -     Urinalysis w/reflex protein, bili ([facility])  -     Urine Culture  Routine ([facility]), f/u pending culture results.    -     CBC with Diff/Plt ([facility])    Fatigue, unspecified type  -     CBC with Diff/Plt ([facility]), entirely normal.    Pelvic pain  Probably due to physiologic cyst. She is advised to use Ibuprofen. She will contact me if the pain worsens.       There are no Patient Instructions on file for this visit.    The following health maintenance items are reviewed in Epic and correct as of today:  Health Maintenance   Topic Date Due     PAP SCREENING Q3 YR (SYSTEM ASSIGNED)  05/05/1993     PHQ-9 Q6 MONTHS  08/27/2013     LIPID SCREEN Q5 YR FEMALE (SYSTEM ASSIGNED)  05/05/2017     INFLUENZA VACCINE (SYSTEM ASSIGNED)  09/01/2017     DEPRESSION ACTION PLAN Q1 YR  01/25/2018     TETANUS IMMUNIZATION (SYSTEM ASSIGNED)  02/27/2023       Leigh Temple MD  [facility]  Family Practice  [facility]  [phone]    For any issues my office # is [phone]

## 2017-08-23 NOTE — MR AVS SNAPSHOT
"              After Visit Summary   2017    Estelle Gomez    MRN: [id number]           Patient Information     Date Of Birth          1970        Visit Information        Provider Department      2017 4:00 PM Leigh Temple MD [facility]        Today's Diagnoses     Urinary frequency    -  1    Fatigue, unspecified type        Pelvic pain           Follow-ups after your visit        Who to contact     If you have questions or need follow up information about today's clinic visit or your schedule please contact [facility] directly at [phone].  Normal or non-critical lab and imaging results will be communicated to you by Tamehart, letter or phone within 4 business days after the clinic has received the results. If you do not hear from us within 7 days, please contact the clinic through [employer] or phone. If you have a critical or abnormal lab result, we will notify you by phone as soon as possible.  Submit refill requests through Livestage or call your pharmacy and they will forward the refill request to us. Please allow 3 business days for your refill to be completed.          Additional Information About Your Visit        MyChart Information     Livestage lets you send messages to your doctor, view your test results, renew your prescriptions, schedule appointments and more. To sign up, go to [email or web address] . Click on \"Log in\" on the left side of the screen, which will take you to the Welcome page. Then click on \"Sign up Now\" on the right side of the page.     You will be asked to enter the access code listed below, as well as some personal information. Please follow the directions to create your username and password.     Your access code is: [email or web address]  Expires: 2017  5:59 PM     Your access code will  in 90 days. If you need help or a new code, please call your [place] clinic or [phone].        Care EveryWhere ID     This is your " Care EveryWhere ID. This could be used by other organizations to access your [place] medical records  [id number]        Your Vitals Were     Pulse Temperature Respirations Last Period Pulse Oximetry BMI (Body Mass Index)    87 99.3  F (37.4  C) (Oral) 20 06/04/2012 98% 20.96 kg/m2       Blood Pressure from Last 3 Encounters:   08/23/17 112/74   01/25/17 100/60   01/11/17 100/68    Weight from Last 3 Encounters:   08/23/17 65.3 kg (144 lb)   01/25/17 60.3 kg (133 lb)   01/11/17 60.8 kg (134 lb)              We Performed the Following     CBC with Diff/Plt ([facility])     Urinalysis w/reflex protein, bili ([facility])     Urine Culture  Routine ([facility])        Primary Care Provider Office Phone # Fax #    Brien Lino [phone] [address] [facility] [address]        Equal Access to Services     ROSALIO SHELLEY : Hadii aad ku hadasho Soomaali, waaxda luqadaha, qaybta kaalmada adeegyada, waxay idiin hayaan ari gonzalezarafarida billy . So [facility] [phone].    ATENCIÓN: Si habla español, tiene a anne disposición servicios gratuitos de asistencia lingüística. Llame al [phone].    We comply with applicable federal civil rights laws and [place] laws. We do not discriminate on the basis of race, color, national origin, age, disability sex, sexual orientation or gender identity.            Thank you!     Thank you for choosing [facility]  for your care. Our goal is always to provide you with excellent care. Hearing back from our patients is one way we can continue to improve our services. Please take a few minutes to complete the written survey that you may receive in the mail after your visit with us. Thank you!             Your Updated Medication List - Protect others around you: Learn how to safely use, store and throw away your medicines at www.disposemymeds.org.          This list is accurate as of: 8/23/17  5:59 PM.  Always use your most recent med list.                   Brand  "Name Dispense Instructions for use Diagnosis    ALPRAZolam 1 MG tablet    XANAX    120 tablet    TAKE 1 TABLET BY MOUTH 4 TIMES DAILY AS NEEDED    Encounter for medication refill       amphetamine-dextroamphetamine 10 MG per tablet    ADDERALL    90 tablet    Take 1 tablet (10 mg) by mouth 3 times daily    ADD (attention deficit disorder)       * cyanocobalamin 1000 MCG/ML injection    VITAMIN B12    1 mL    Inject 1 mL (1,000 mcg) into the muscle every 30 days    Vitamin B12 deficiency (non anemic)       * cyanocobalamin 1000 MCG/ML injection    VITAMIN B12    25 mL    Inject 1 mL (1,000 mcg) into the muscle every 7 days    Recurrent major depressive disorder, in partial remission (H)       Insulin Syringe 29G X 1/2\" 1 ML Misc    BD insulin syringe    50 each    Use one syringe weekly for B12 injection.    Recurrent major depressive disorder, in partial remission (H)       lidocaine-prilocaine cream    EMLA    30 g    Apply to affected area  as needed prior to treatment    Encounter for medication refill       omeprazole 20 MG CR capsule    priLOSEC    180 capsule    Take 1 capsule (20 mg) by mouth 2 times daily    Nausea       ondansetron 4 MG tablet    ZOFRAN    180 tablet    Take 1 tablet (4 mg) by mouth every 8 hours as needed for nausea    Nausea       * traZODone 50 MG tablet    DESYREL    90 tablet    TAKE ONE TABLET BY MOUTH NIGHTLY AT BEDTIME AS NEEDED FOR SLEEP    Encounter for medication refill       * traZODone 100 MG tablet    DESYREL    180 tablet    Take 1 tablet (100 mg) by mouth 2 times daily    Encounter for medication refill       varenicline 0.5 MG X 11 & 1 MG X 42 tablet    CHANTIX STARTING MONTH SATHISH    53 tablet    Take 0.5 mg tab daily for 3 days, then 0.5 mg tab twice daily for 4 days, then 1 mg twice daily.    Encounter for smoking cessation counseling       * VENTOLIN  (90 BASE) MCG/ACT Inhaler   Generic drug:  albuterol     18 g    INHALE TWO PUFFS BY MOUTH EVERY SIX HOURS AS " NEEDED FOR SHORTNESS OF BREATH    Encounter for medication refill       * albuterol 108 (90 BASE) MCG/ACT Inhaler    PROAIR HFA/PROVENTIL HFA/VENTOLIN HFA    3 Inhaler    Inhale 2 puffs into the lungs every 6 hours as needed for shortness of breath / dyspnea or wheezing    Mild intermittent asthma without complication       * Notice:  This list has 6 medication(s) that are the same as other medications prescribed for you. Read the directions carefully, and ask your doctor or other care provider to review them with you.

## 2017-08-23 NOTE — LETTER
[facility]  [address] [place]  [address]  Phone: [phone]    August 25, 2017      Estelle Gomez  [address]  [facility] [address]              Dear Estelle,    The results from your recent visit showed Urine culture is negative.         Sincerely,     Leigh Temple M.D.    Results for orders placed or performed in visit on 08/23/17   Urinalysis w/reflex protein, bili ([facility])   Result Value Ref Range    Color Urine Yellow     pH Urine 5.5 5 - 9 pH    Specific Gravity Urine 1.020 1.005 - 1.025    Protein Urine 0 0.01 mg/dL    Glucose Urine 0     Ketones Urine Trace (A)     Leukocyte Esterase Urine 0     Blood Urine Trace (A)     Nitrite Urine Neg NEG    Bilirubin Urine Dip 0     Urobilinogen Urine 0.2 0.2 - 1.0 EU/dL    WBC Urine 0 0 - 3    RBC Urine 0-3 0 - 3    Epithelial Cells Rare     Crystal Urine 0     Bacteria Urine 0     Mucous Urine 0     Casts/LPF 0    Urine Culture  Routine ([facility])   Result Value Ref Range    Urine Culture Final report     Result 1 No growth     Narrative    Performed at:  01 - [facility] [address], [place], [place]  [id number]  : Johnathan Garcia MD, Phone:  [phone]   CBC with Diff/Plt ([facility])   Result Value Ref Range    WBC x10/cmm 7.4 3.8 - 11.0 x10/cmm    % Lymphocytes 25.1 20.5 - 51.1 %    % Monocytes 6.5 1.7 - 9.3 %    % Granulocytes 68.4 42.2 - 75.2 %    RBC x10/cmm 4.80 3.7 - 5.2 x10/cmm    Hemoglobin 15.0 11.8 - 15.5 g/dl    Hematocrit 45.3 35 - 46 %    MCV 94.2 80 - 100 fL    MCH 31.3 (A) 27.0 - 31.0 pg    MCHC 33.2 33.0 - 37.0 g/dL    Platelet Count 282 140 - 450 K/uL

## 2017-08-25 LAB
Lab: NO GROWTH
URINE CULTURE: NORMAL

## 2017-08-29 ENCOUNTER — TELEPHONE (OUTPATIENT)
Dept: PHARMACY | Facility: PHYSICIAN GROUP | Age: 47
End: 2017-08-29

## 2017-08-31 NOTE — TELEPHONE ENCOUNTER
8/30/17 left message for patient to call nurse to discuss Dr. Temple's options and how patient may want to proceed.  Mi Valverde RN

## 2017-08-31 NOTE — TELEPHONE ENCOUNTER
"----- Message from Leigh Temple MD sent at 8/29/2017 10:37 PM CDT -----  Regarding: RE: update   Options include: refer to urology regarding incomplete emptying of bladder, be seen again here regarding fatigue, if still with low abdominal pain check pelvic U/S.    ----- Message -----     From: Mi Valverde RN     Sent: 8/29/2017   3:10 PM       To: Leigh Temple MD  Subject: update                                           Patient calls today saying still bothered by urinary frequency and not feeling like emptying. Before even gets off toilet, feels like still has to urinate. Denies dysuria or hematuria. Darkish urine despite drinks lot of water. Denies vaginal infection symptoms. Still fatigued despite well hydrated, eating well and \"take good care of myself\". Still running intermittent temps up to 99.6 in evenings - says her normal is 98.3.  Symptoms for \"few weeks\".  Hx hysterectomy - still has cervix and ovaries.   8/23 urine culture =no growth.  What do you recommend?   Thanks  Mi     "

## 2017-09-28 DIAGNOSIS — Z76.0 ENCOUNTER FOR MEDICATION REFILL: ICD-10-CM

## 2017-09-28 DIAGNOSIS — F41.1 GENERALIZED ANXIETY DISORDER: Primary | ICD-10-CM

## 2017-09-28 NOTE — TELEPHONE ENCOUNTER
Dr. Sal reviewed alprazolam refill request and states needs toxasure and med visit. Schedule within 1-2 weeks. Then Ok to order supply to get to that appointment, no refills.  Controlled substance agreement 1/25/17 in chart.   No toxassure in chart.   Per [place]  Database - last filled alpraz 1mg #120 tabs (30 day supply) on: 8/20/17, 7/15/17, 6/2/17, 3/28/17, 2/28/17 and 1/25/17.   Last visit with MD regarding depression/anxiety was 1/25/17 with Dr. Lino.  No future appts scheduled.  Called patient to inform needs med check - patient upset that this rx refill is being questioned by ruth GARCIA. Explained rationale.   Appt scheduled with Dr. Lino for 10/25/17. Refill phoned to pharmacy.   Mi Valverde RN

## 2017-10-02 RX ORDER — ALPRAZOLAM 1 MG
TABLET ORAL
Qty: 120 TABLET | Refills: 0 | COMMUNITY
Start: 2017-10-02 | End: 2017-10-25

## 2017-10-25 ENCOUNTER — OFFICE VISIT (OUTPATIENT)
Dept: FAMILY MEDICINE | Facility: CLINIC | Age: 47
End: 2017-10-25

## 2017-10-25 VITALS
BODY MASS INDEX: 20.19 KG/M2 | HEIGHT: 70 IN | OXYGEN SATURATION: 97 % | HEART RATE: 90 BPM | WEIGHT: 141 LBS | SYSTOLIC BLOOD PRESSURE: 90 MMHG | DIASTOLIC BLOOD PRESSURE: 60 MMHG

## 2017-10-25 DIAGNOSIS — F98.8 ADD (ATTENTION DEFICIT DISORDER) WITHOUT HYPERACTIVITY: ICD-10-CM

## 2017-10-25 DIAGNOSIS — R00.0 TACHYCARDIA: Primary | ICD-10-CM

## 2017-10-25 DIAGNOSIS — Z76.0 ENCOUNTER FOR MEDICATION REFILL: ICD-10-CM

## 2017-10-25 DIAGNOSIS — F41.1 GENERALIZED ANXIETY DISORDER: ICD-10-CM

## 2017-10-25 DIAGNOSIS — R11.0 NAUSEA: ICD-10-CM

## 2017-10-25 PROCEDURE — 99214 OFFICE O/P EST MOD 30 MIN: CPT | Performed by: FAMILY MEDICINE

## 2017-10-25 RX ORDER — DEXTROAMPHETAMINE SACCHARATE, AMPHETAMINE ASPARTATE, DEXTROAMPHETAMINE SULFATE AND AMPHETAMINE SULFATE 2.5; 2.5; 2.5; 2.5 MG/1; MG/1; MG/1; MG/1
10 TABLET ORAL 3 TIMES DAILY
Qty: 90 TABLET | Refills: 0 | Status: SHIPPED | OUTPATIENT
Start: 2017-11-25 | End: 2017-12-25

## 2017-10-25 RX ORDER — DEXTROAMPHETAMINE SACCHARATE, AMPHETAMINE ASPARTATE, DEXTROAMPHETAMINE SULFATE AND AMPHETAMINE SULFATE 2.5; 2.5; 2.5; 2.5 MG/1; MG/1; MG/1; MG/1
10 TABLET ORAL 3 TIMES DAILY
Qty: 90 TABLET | Refills: 0 | Status: SHIPPED | OUTPATIENT
Start: 2017-10-25 | End: 2017-11-24

## 2017-10-25 RX ORDER — PROPRANOLOL HYDROCHLORIDE 10 MG/1
10 TABLET ORAL 2 TIMES DAILY
Qty: 60 TABLET | Refills: 1 | Status: SHIPPED | OUTPATIENT
Start: 2017-10-25 | End: 2017-12-19

## 2017-10-25 RX ORDER — ALPRAZOLAM 1 MG
TABLET ORAL
Qty: 120 TABLET | Refills: 3 | Status: SHIPPED | OUTPATIENT
Start: 2017-10-25 | End: 2018-03-30

## 2017-10-25 RX ORDER — ONDANSETRON 4 MG/1
4-8 TABLET, ORALLY DISINTEGRATING ORAL EVERY 8 HOURS PRN
Qty: 20 TABLET | Refills: 1 | Status: SHIPPED | OUTPATIENT
Start: 2017-10-25 | End: 2018-02-16

## 2017-10-25 RX ORDER — DEXTROAMPHETAMINE SACCHARATE, AMPHETAMINE ASPARTATE, DEXTROAMPHETAMINE SULFATE AND AMPHETAMINE SULFATE 2.5; 2.5; 2.5; 2.5 MG/1; MG/1; MG/1; MG/1
10 TABLET ORAL 3 TIMES DAILY
Qty: 90 TABLET | Refills: 0 | Status: SHIPPED | OUTPATIENT
Start: 2017-12-25 | End: 2018-01-24

## 2017-10-25 RX ORDER — TRAZODONE HYDROCHLORIDE 50 MG/1
TABLET, FILM COATED ORAL
Qty: 90 TABLET | Refills: 2 | Status: SHIPPED | OUTPATIENT
Start: 2017-10-25 | End: 2018-08-13

## 2017-10-25 RX ORDER — TRAZODONE HYDROCHLORIDE 100 MG/1
100 TABLET ORAL 2 TIMES DAILY
Qty: 180 TABLET | Refills: 2 | Status: SHIPPED | OUTPATIENT
Start: 2017-10-25 | End: 2018-08-13

## 2017-10-25 NOTE — PROGRESS NOTES
"4 episodes of rapid heartbeat of about 110, lasting up to 15 minutes, in the last 6 weeks. It has happened for years, not as often. She has had stress ECHO's and Holter monitor in the past, all normal. She has never been on a beta blocker. Happens at rest, usually. Associated with sensation of SOB(normally no BENAVIDEZ with heavy exercise).   Otherwise she is feeling well, and doing Pilates. She continues her high dose of alprazolam, with no ill effects.  BP 90/60  Pulse 90  Ht 1.765 m (5' 9.5\")  Wt 64 kg (141 lb)  LMP 06/04/2012  SpO2 97%  BMI 20.52 kg/m2   NAD healthy looking, good mood and insight. RRR. No murmur. Lungs are clear.   (R00.0) Tachycardia  (primary encounter diagnosis)  Comment: PAT likely  Plan: propranolol (INDERAL) 10 MG tablet        Try bid for a trial, to see if she feels better, may reduce tigist for benzo's.    (F41.1) Generalized anxiety disorder  Comment:   Plan: ALPRAZolam (XANAX) 1 MG tablet            (F98.8) ADD (attention deficit disorder) without hyperactivity  Comment:   Plan: amphetamine-dextroamphetamine (ADDERALL) 10 MG         per tablet, amphetamine-dextroamphetamine         (ADDERALL) 10 MG per tablet,         amphetamine-dextroamphetamine (ADDERALL) 10 MG         per tablet            (Z76.0) Encounter for medication refill  Comment:   Plan: traZODone (DESYREL) 50 MG tablet, traZODone         (DESYREL) 100 MG tablet            (R11.0) Nausea  Comment: rarely, would like to have some Zofran on hand  Plan: ondansetron (ZOFRAN ODT) 4 MG ODT tab                "

## 2017-10-25 NOTE — MR AVS SNAPSHOT
"              After Visit Summary   10/25/2017    Estelle Gomez    MRN: [id number]           Patient Information     Date Of Birth          1970        Visit Information        Provider Department      10/25/2017 9:45 AM Brien Lino MD [facility]        Today's Diagnoses     Tachycardia    -  1    Generalized anxiety disorder        ADD (attention deficit disorder) without hyperactivity        Encounter for medication refill        Nausea           Follow-ups after your visit        Who to contact     If you have questions or need follow up information about today's clinic visit or your schedule please contact [facility] directly at [phone].  Normal or non-critical lab and imaging results will be communicated to you by SecureWorkshart, letter or phone within 4 business days after the clinic has received the results. If you do not hear from us within 7 days, please contact the clinic through SecureWorkshart or phone. If you have a critical or abnormal lab result, we will notify you by phone as soon as possible.  Submit refill requests through Stereobot or call your pharmacy and they will forward the refill request to us. Please allow 3 business days for your refill to be completed.          Additional Information About Your Visit        MyChart Information     Stereobot lets you send messages to your doctor, view your test results, renew your prescriptions, schedule appointments and more. To sign up, go to [email or web address] . Click on \"Log in\" on the left side of the screen, which will take you to the Welcome page. Then click on \"Sign up Now\" on the right side of the page.     You will be asked to enter the access code listed below, as well as some personal information. Please follow the directions to create your username and password.     Your access code is: [email or web address]  Expires: 2017  5:59 PM     Your access code will  in 90 days. If you need help or a new code, " "please call your [place] clinic or [phone].        Care EveryWhere ID     This is your Care EveryWhere ID. This could be used by other organizations to access your [place] medical records  [id number]        Your Vitals Were     Pulse Height Last Period Pulse Oximetry BMI (Body Mass Index)       90 1.765 m (5' 9.5\") 06/04/2012 97% 20.52 kg/m2        Blood Pressure from Last 3 Encounters:   10/25/17 90/60   08/23/17 112/74   01/25/17 100/60    Weight from Last 3 Encounters:   10/25/17 64 kg (141 lb)   08/23/17 65.3 kg (144 lb)   01/25/17 60.3 kg (133 lb)              Today, you had the following     No orders found for display         Today's Medication Changes          These changes are accurate as of: 10/25/17  1:05 PM.  If you have any questions, ask your nurse or doctor.               Start taking these medicines.        Dose/Directions    ondansetron 4 MG ODT tab   Commonly known as:  ZOFRAN ODT   Used for:  Nausea   Started by:  Brien Lino MD        Dose:  4-8 mg   Take 1-2 tablets (4-8 mg) by mouth every 8 hours as needed for nausea   Quantity:  20 tablet   Refills:  1       propranolol 10 MG tablet   Commonly known as:  INDERAL   Used for:  Tachycardia   Started by:  Brien Lino MD        Dose:  10 mg   Take 1 tablet (10 mg) by mouth 2 times daily   Quantity:  60 tablet   Refills:  1         These medicines have changed or have updated prescriptions.        Dose/Directions    ALPRAZolam 1 MG tablet   Commonly known as:  XANAX   This may have changed:  See the new instructions.   Used for:  Generalized anxiety disorder   Changed by:  Brien Lino MD        TAKE ONE TABLET BY MOUTH FOUR TIMES DAILY AS NEEDED   Quantity:  120 tablet   Refills:  3       * amphetamine-dextroamphetamine 10 MG per tablet   Commonly known as:  ADDERALL   This may have changed:  Another medication with the same name was added. Make sure you understand how and when to take each.   Used for:  ADD (attention " deficit disorder)   Changed by:  Brien Lino MD        Dose:  10 mg   Take 1 tablet (10 mg) by mouth 3 times daily   Quantity:  90 tablet   Refills:  0       * amphetamine-dextroamphetamine 10 MG per tablet   Commonly known as:  ADDERALL   This may have changed:  You were already taking a medication with the same name, and this prescription was added. Make sure you understand how and when to take each.   Used for:  ADD (attention deficit disorder) without hyperactivity   Changed by:  Brien Lino MD        Dose:  10 mg   Take 1 tablet (10 mg) by mouth 3 times daily   Quantity:  90 tablet   Refills:  0       * amphetamine-dextroamphetamine 10 MG per tablet   Commonly known as:  ADDERALL   This may have changed:  You were already taking a medication with the same name, and this prescription was added. Make sure you understand how and when to take each.   Used for:  ADD (attention deficit disorder) without hyperactivity   Changed by:  Brien Lino MD        Dose:  10 mg   Start taking on:  11/25/2017   Take 1 tablet (10 mg) by mouth 3 times daily   Quantity:  90 tablet   Refills:  0       * amphetamine-dextroamphetamine 10 MG per tablet   Commonly known as:  ADDERALL   This may have changed:  You were already taking a medication with the same name, and this prescription was added. Make sure you understand how and when to take each.   Used for:  ADD (attention deficit disorder) without hyperactivity   Changed by:  Brien Lino MD        Dose:  10 mg   Start taking on:  12/25/2017   Take 1 tablet (10 mg) by mouth 3 times daily   Quantity:  90 tablet   Refills:  0       * Notice:  This list has 4 medication(s) that are the same as other medications prescribed for you. Read the directions carefully, and ask your doctor or other care provider to review them with you.         Where to get your medicines      These medications were sent to [facility] [address] [place] [address]   [address] [place], [facility] [address]     Phone:  [phone]     ondansetron 4 MG ODT tab    propranolol 10 MG tablet    traZODone 100 MG tablet    traZODone 50 MG tablet         Some of these will need a paper prescription and others can be bought over the counter.  Ask your nurse if you have questions.     Bring a paper prescription for each of these medications     ALPRAZolam 1 MG tablet    amphetamine-dextroamphetamine 10 MG per tablet    amphetamine-dextroamphetamine 10 MG per tablet    amphetamine-dextroamphetamine 10 MG per tablet                Primary Care Provider Office Phone # Fax #    Brien Lino [phone] [address]  [facility] [address]        Equal Access to Services     RAMON SHELLEY : Hadii aad ku hadasho Soomaali, waaxda luqadaha, qaybta kaalmada adeegyada, waxay idiin hayaan ari billy . So [facility] [phone].    ATENCIÓN: Si habla español, tiene a anne disposición servicios gratuitos de asistencia lingüística. [facility] [phone].    We comply with applicable federal civil rights laws and [place] laws. We do not discriminate on the basis of race, color, national origin, age, disability, sex, sexual orientation, or gender identity.            Thank you!     Thank you for choosing [facility]  for your care. Our goal is always to provide you with excellent care. Hearing back from our patients is one way we can continue to improve our services. Please take a few minutes to complete the written survey that you may receive in the mail after your visit with us. Thank you!             Your Updated Medication List - Protect others around you: Learn how to safely use, store and throw away your medicines at www.disposemymeds.org.          This list is accurate as of: 10/25/17  1:05 PM.  Always use your most recent med list.                   Brand Name Dispense Instructions for use Diagnosis    ALPRAZolam 1 MG tablet    XANAX    120 tablet  "   TAKE ONE TABLET BY MOUTH FOUR TIMES DAILY AS NEEDED    Generalized anxiety disorder       * amphetamine-dextroamphetamine 10 MG per tablet    ADDERALL    90 tablet    Take 1 tablet (10 mg) by mouth 3 times daily    ADD (attention deficit disorder)       * amphetamine-dextroamphetamine 10 MG per tablet    ADDERALL    90 tablet    Take 1 tablet (10 mg) by mouth 3 times daily    ADD (attention deficit disorder) without hyperactivity       * amphetamine-dextroamphetamine 10 MG per tablet   Start taking on:  11/25/2017    ADDERALL    90 tablet    Take 1 tablet (10 mg) by mouth 3 times daily    ADD (attention deficit disorder) without hyperactivity       * amphetamine-dextroamphetamine 10 MG per tablet   Start taking on:  12/25/2017    ADDERALL    90 tablet    Take 1 tablet (10 mg) by mouth 3 times daily    ADD (attention deficit disorder) without hyperactivity       * cyanocobalamin 1000 MCG/ML injection    VITAMIN B12    1 mL    Inject 1 mL (1,000 mcg) into the muscle every 30 days    Vitamin B12 deficiency (non anemic)       * cyanocobalamin 1000 MCG/ML injection    VITAMIN B12    25 mL    Inject 1 mL (1,000 mcg) into the muscle every 7 days    Recurrent major depressive disorder, in partial remission (H)       Insulin Syringe 29G X 1/2\" 1 ML Misc    BD insulin syringe    50 each    Use one syringe weekly for B12 injection.    Recurrent major depressive disorder, in partial remission (H)       lidocaine-prilocaine cream    EMLA    30 g    Apply to affected area  as needed prior to treatment    Encounter for medication refill       omeprazole 20 MG CR capsule    priLOSEC    180 capsule    Take 1 capsule (20 mg) by mouth 2 times daily    Nausea       ondansetron 4 MG ODT tab    ZOFRAN ODT    20 tablet    Take 1-2 tablets (4-8 mg) by mouth every 8 hours as needed for nausea    Nausea       ondansetron 4 MG tablet    ZOFRAN    180 tablet    Take 1 tablet (4 mg) by mouth every 8 hours as needed for nausea    Nausea       " propranolol 10 MG tablet    INDERAL    60 tablet    Take 1 tablet (10 mg) by mouth 2 times daily    Tachycardia       * traZODone 50 MG tablet    DESYREL    90 tablet    TAKE ONE TABLET BY MOUTH NIGHTLY AT BEDTIME AS NEEDED FOR SLEEP    Encounter for medication refill       * traZODone 100 MG tablet    DESYREL    180 tablet    Take 1 tablet (100 mg) by mouth 2 times daily    Encounter for medication refill       varenicline 0.5 MG X 11 & 1 MG X 42 tablet    CHANTIX STARTING MONTH SATHISH    53 tablet    Take 0.5 mg tab daily for 3 days, then 0.5 mg tab twice daily for 4 days, then 1 mg twice daily.    Encounter for smoking cessation counseling       * VENTOLIN  (90 BASE) MCG/ACT Inhaler   Generic drug:  albuterol     18 g    INHALE TWO PUFFS BY MOUTH EVERY SIX HOURS AS NEEDED FOR SHORTNESS OF BREATH    Encounter for medication refill       * albuterol 108 (90 BASE) MCG/ACT Inhaler    PROAIR HFA/PROVENTIL HFA/VENTOLIN HFA    3 Inhaler    Inhale 2 puffs into the lungs every 6 hours as needed for shortness of breath / dyspnea or wheezing    Mild intermittent asthma without complication       * Notice:  This list has 10 medication(s) that are the same as other medications prescribed for you. Read the directions carefully, and ask your doctor or other care provider to review them with you.

## 2017-11-17 DIAGNOSIS — Z76.0 ENCOUNTER FOR MEDICATION REFILL: ICD-10-CM

## 2017-11-17 RX ORDER — LIDOCAINE/PRILOCAINE 2.5 %-2.5%
CREAM (GRAM) TOPICAL
Qty: 30 G | Refills: 1 | Status: SHIPPED | OUTPATIENT
Start: 2017-11-17 | End: 2019-01-03

## 2017-11-17 NOTE — TELEPHONE ENCOUNTER
lidocaine-prilocaine (EMLA) cream last ov - 10/25/17 l;ast labs 8/23/17  Jaqui Fountain MA November 17, 2017 11:25 AM  Need dx for rx

## 2017-12-19 DIAGNOSIS — R00.0 TACHYCARDIA: ICD-10-CM

## 2017-12-19 RX ORDER — PROPRANOLOL HYDROCHLORIDE 10 MG/1
TABLET ORAL
Qty: 60 TABLET | Refills: 1 | Status: SHIPPED | OUTPATIENT
Start: 2017-12-19 | End: 2018-02-16

## 2018-02-16 DIAGNOSIS — R00.0 TACHYCARDIA: ICD-10-CM

## 2018-02-16 DIAGNOSIS — R11.0 NAUSEA: ICD-10-CM

## 2018-02-16 RX ORDER — ONDANSETRON 4 MG/1
TABLET, ORALLY DISINTEGRATING ORAL
Qty: 20 TABLET | Refills: 0 | Status: SHIPPED | OUTPATIENT
Start: 2018-02-16 | End: 2018-07-30

## 2018-02-16 RX ORDER — CYANOCOBALAMIN 1000 UG/ML
INJECTION, SOLUTION INTRAMUSCULAR; SUBCUTANEOUS
Qty: 25 ML | Refills: 0 | Status: SHIPPED | OUTPATIENT
Start: 2018-02-16 | End: 2018-08-13

## 2018-02-16 RX ORDER — PROPRANOLOL HYDROCHLORIDE 10 MG/1
TABLET ORAL
Qty: 60 TABLET | Refills: 1 | Status: SHIPPED | OUTPATIENT
Start: 2018-02-16 | End: 2018-05-14

## 2018-02-23 DIAGNOSIS — R11.0 NAUSEA: ICD-10-CM

## 2018-02-23 NOTE — TELEPHONE ENCOUNTER
omeprazole (PRILOSEC) 20 MG CR capsule   LAST  Med check 10/25/17   last labs(for RX) 8/23/17  Next  appt scheduled =  none  Jaqui Fountain MA    PHQ-9 SCORE 8/17/2012 2/27/2013   Total Score 10 3

## 2018-03-30 DIAGNOSIS — F41.1 GENERALIZED ANXIETY DISORDER: ICD-10-CM

## 2018-03-30 RX ORDER — ALPRAZOLAM 1 MG
TABLET ORAL
Qty: 120 TABLET | Refills: 1 | Status: SHIPPED | OUTPATIENT
Start: 2018-03-30 | End: 2018-07-30

## 2018-05-14 DIAGNOSIS — R00.0 TACHYCARDIA: ICD-10-CM

## 2018-05-15 RX ORDER — PROPRANOLOL HYDROCHLORIDE 10 MG/1
TABLET ORAL
Qty: 60 TABLET | Refills: 1 | Status: SHIPPED | OUTPATIENT
Start: 2018-05-15 | End: 2018-08-15

## 2018-07-05 ENCOUNTER — OFFICE VISIT (OUTPATIENT)
Dept: FAMILY MEDICINE | Facility: CLINIC | Age: 48
End: 2018-07-05

## 2018-07-05 VITALS
RESPIRATION RATE: 16 BRPM | SYSTOLIC BLOOD PRESSURE: 120 MMHG | WEIGHT: 142 LBS | DIASTOLIC BLOOD PRESSURE: 84 MMHG | HEART RATE: 94 BPM | HEIGHT: 70 IN | BODY MASS INDEX: 20.33 KG/M2 | OXYGEN SATURATION: 94 %

## 2018-07-05 DIAGNOSIS — R52 MODERATE PAIN: ICD-10-CM

## 2018-07-05 DIAGNOSIS — Z09 FOLLOW-UP EXAMINATION FOLLOWING SURGERY: Primary | ICD-10-CM

## 2018-07-05 PROCEDURE — 99213 OFFICE O/P EST LOW 20 MIN: CPT | Performed by: FAMILY MEDICINE

## 2018-07-05 RX ORDER — OXYCODONE AND ACETAMINOPHEN 7.5; 325 MG/1; MG/1
TABLET ORAL
Qty: 30 TABLET | Refills: 0 | Status: SHIPPED | OUTPATIENT
Start: 2018-07-05 | End: 2021-04-08

## 2018-07-05 ASSESSMENT — PAIN SCALES - GENERAL: PAINLEVEL: EXTREME PAIN (8)

## 2018-07-05 NOTE — LETTER
My Depression Action Plan  Name: Estelle Gomez   Date of Birth 1970  Date: 7/5/2018    My doctor: No primary care provider on file.   My clinic: [facility] [address] [place] [address] [phone]          GREEN    ZONE   Good Control    What it looks like:     Things are going generally well. You have normal up s and down s. You may even feel depressed from time to time, but bad moods usually last less than a day.   What you need to do:  1. Continue to care for yourself (see self care plan)  2. Check your depression survival kit and update it as needed  3. Follow your physician s recommendations including any medication.  4. Do not stop taking medication unless you consult with your physician first.           YELLOW         ZONE Getting Worse    What it looks like:     Depression is starting to interfere with your life.     It may be hard to get out of bed; you may be starting to isolate yourself from others.    Symptoms of depression are starting to last most all day and this has happened for several days.     You may have suicidal thoughts but they are not constant.   What you need to do:     1. Call your care team, your response to treatment will improve if you keep your care team informed of your progress. Yellow periods are signs an adjustment may need to be made.     2. Continue your self-care, even if you have to fake it!    3. Talk to someone in your support network    4. Open up your depression survival kit           RED    ZONE Medical Alert - Get Help    What it looks like:     Depression is seriously interfering with your life.     You may experience these or other symptoms: You can t get out of bed most days, can t work or engage in other necessary activities, you have trouble taking care of basic hygiene, or basic responsibilities, thoughts of suicide or death that will not go away, self-injurious behavior.     What you need to do:  1. Call  your care team and request a same-day appointment. If they are not available (weekends or after hours) call your local crisis line, emergency room or 911.            Depression Self Care Plan / Survival Kit    Self-Care for Depression  Here s the deal. Your body and mind are really not as separate as most people think.  What you do and think affects how you feel and how you feel influences what you do and think. This means if you do things that people who feel good do, it will help you feel better.  Sometimes this is all it takes.  There is also a place for medication and therapy depending on how severe your depression is, so be sure to consult with your medical provider and/ or Behavioral Health Consultant if your symptoms are worsening or not improving.     In order to better manage my stress, I will:    Exercise  Get some form of exercise, every day. This will help reduce pain and release endorphins, the  feel good  chemicals in your brain. This is almost as good as taking antidepressants!  This is not the same as joining a gym and then never going! (they count on that by the way ) It can be as simple as just going for a walk or doing some gardening, anything that will get you moving.      Hygiene   Maintain good hygiene (Get out of bed in the morning, Make your bed, Brush your teeth, Take a shower, and Get dressed like you were going to work, even if you are unemployed).  If your clothes don't fit try to get ones that do.    Diet  I will strive to eat foods that are good for me, drink plenty of water, and avoid excessive sugar, caffeine, alcohol, and other mood-altering substances.  Some foods that are helpful in depression are: complex carbohydrates, B vitamins, flaxseed, fish or fish oil, fresh fruits and vegetables.    Psychotherapy  I agree to participate in Individual Therapy (if recommended).    Medication  If prescribed medications, I agree to take them.  Missing doses can result in serious side effects.   I understand that drinking alcohol, or other illicit drug use, may cause potential side effects.  I will not stop my medication abruptly without first discussing it with my provider.    Staying Connected With Others  I will stay in touch with my friends, family members, and my primary care provider/team.    Use your imagination  Be creative.  We all have a creative side; it doesn t matter if it s oil painting, sand castles, or mud pies! This will also kick up the endorphins.    Witness Beauty  (AKA stop and smell the roses) Take a look outside, even in mid-winter. Notice colors, textures. Watch the squirrels and birds.     Service to others  Be of service to others.  There is always someone else in need.  By helping others we can  get out of ourselves  and remember the really important things.  This also provides opportunities for practicing all the other parts of the program.    Humor  Laugh and be silly!  Adjust your TV habits for less news and crime-drama and more comedy.    Control your stress  Try breathing deep, massage therapy, biofeedback, and meditation. Find time to relax each day.     My support system    Clinic Contact:  Phone number:    Contact 1:  Phone number:    Contact 2:  Phone number:    [employer]/:  Phone number:    Therapist:  Phone number:    [facility] crisis center:    Phone number:    Other community support:  Phone number:

## 2018-07-05 NOTE — PROGRESS NOTES
"Problem(s) Oriented visit        SUBJECTIVE:                                                    Estelle Gomez is a 48 year old female who presents to clinic today for follow up after surgery which took place in [place]. She had bilateral breast augmentation re-do with removal of saline, removal of scar tissue, moving of the nipples, and implantation of silicone implants. This was done 6/29/18. She also had upper lid bilateral blepharectomy, same day by the same surgeon. She was given percocet for pain, but she reports that she was limited to only 3 days of pain meds, guessing that she received #12. She takes one percocet at a time, initially taking it every 4 hours. Her face is mildly tender but tolerable. She has constant pain in the lower outer quadrants in spite of keeping activity at the bare minimum. She was seen for post-op checks on 6/30 and 7/2. Right now she is using Tylenol, which is likely helping a little. She is requesting \"the large percocet.\"       Problem list, Medication list, Allergies, and Medical/Social/Surgical histories reviewed in [facility] and updated as appropriate.   Additional history: as documented    ROS:  5 point ROS completed and negative except noted above, including Gen, CV, Resp, GI, MS      Histories:   Patient Active Problem List   Diagnosis     Depression     Major depressive disorder, recurrent episode (H)     Insomnia     Generalized anxiety disorder     ADD (attention deficit disorder)     Pain medication agreement signed     Past Surgical History:   Procedure Laterality Date     ABDOMEN SURGERY      tummy tuck     BREAST SURGERY      augmentation     CHOLECYSTECTOMY       LAPAROSCOPIC HYSTERECTOMY SUPRACERVICAL  6/4/2012    Procedure:LAPAROSCOPIC HYSTERECTOMY SUPRACERVICAL; Laparoscopic Supracervical Hysterectomy, revision of naval; Surgeon:GUILHERME NAVAS; Location:[facility] OR     TONSILLECTOMY         Social History   Substance Use Topics     Smoking status: Current " "Every Day Smoker     Types: Cigarettes     Smokeless tobacco: Never Used      Comment: 3 cig./ day- tried chantix     Alcohol use Yes      Comment: 1-2 drinks per month     No family history on file.        OBJECTIVE:                                                    /84  Pulse 94  Resp 16  Ht 1.765 m (5' 9.5\")  Wt 64.4 kg (142 lb)  LMP 06/04/2012  SpO2 94%  BMI 20.67 kg/m2  Body mass index is 20.67 kg/(m^2).   GENERAL APPEARANCE: Alert, no acute distress  EYES: upper eyelids with sutures seen sticking out of the incision site bilaterally, no erythema, ecchymosis is seen under both eyes  BREAST: bilateral breasts have steri-strips in place over incisions that go around both nipples, vertical from the low nipple to under the breast, and in an arc encompassing the area under each breast. Extensive ecchymosis is noted, but no erythema, wounds are dry.  NEURO: Alert, oriented, speech and mentation normal  PSYCH: mentation appears normal, affect and mood normal   Labs Resulted Today:   Results for orders placed or performed in visit on 08/23/17   Urinalysis w/reflex protein, bili ([facility])   Result Value Ref Range    Color Urine Yellow     pH Urine 5.5 5 - 9 pH    Specific Gravity Urine 1.020 1.005 - 1.025    Protein Urine 0 0.01 mg/dL    Glucose Urine 0     Ketones Urine Trace (A)     Leukocyte Esterase Urine 0     Blood Urine Trace (A)     Nitrite Urine Neg NEG    Bilirubin Urine Dip 0     Urobilinogen Urine 0.2 0.2 - 1.0 EU/dL    WBC Urine 0 0 - 3    RBC Urine 0-3 0 - 3    Epithelial Cells Rare     Crystal Urine 0     Bacteria Urine 0     Mucous Urine 0     Casts/LPF 0    Urine Culture  Routine ([facility])   Result Value Ref Range    Urine Culture Final report     Result 1 No growth     Narrative    Performed at:  01 - [facility]  [address], [place], [place]  [id number]  : Johnathan Garcia MD, Phone:  [phone]   CBC with Diff/Plt ([facility])   Result Value Ref Range    WBC x10/cmm 7.4 3.8 - 11.0 " x10/cmm    % Lymphocytes 25.1 20.5 - 51.1 %    % Monocytes 6.5 1.7 - 9.3 %    % Granulocytes 68.4 42.2 - 75.2 %    RBC x10/cmm 4.80 3.7 - 5.2 x10/cmm    Hemoglobin 15.0 11.8 - 15.5 g/dl    Hematocrit 45.3 35 - 46 %    MCV 94.2 80 - 100 fL    MCH 31.3 (A) 27.0 - 31.0 pg    MCHC 33.2 33.0 - 37.0 g/dL    Platelet Count 282 140 - 450 K/uL     ASSESSMENT/PLAN:                                                        Estelle was seen today for surgical followup.    Diagnoses and all orders for this visit:    Follow-up examination following surgery  Appears to have good healing. She is encouraged to contact her surgeon for specific follow up questions.    Moderate pain  -     oxyCODONE-acetaminophen (PERCOCET) 7.5-325 MG per tablet; 1/2-1 po every 4-6 hours prn pain  I anticipate this will be enough pain medication for her recovery. She will need to be seen if she has pain requiring further narcotic.    Other orders  -     DEPRESSION ACTION PLAN (DAP)      There are no Patient Instructions on file for this visit.    The following health maintenance items are reviewed in Epic and correct as of today:  Health Maintenance   Topic Date Due     ASTHMA CONTROL TEST Q6 MOS  05/05/1977     HIV SCREEN (SYSTEM ASSIGNED)  05/05/1990     PAP SCREENING Q3 YR (SYSTEM ASSIGNED)  05/05/1993     PHQ-9 Q6 MONTHS  08/27/2013     LIPID SCREEN Q5 YR FEMALE (SYSTEM ASSIGNED)  05/05/2017     INFLUENZA VACCINE (1) 09/01/2018     ASTHMA ACTION PLAN Q1 YR  07/05/2019     DEPRESSION ACTION PLAN Q1 YR  07/05/2019     TETANUS IMMUNIZATION (SYSTEM ASSIGNED)  02/27/2023       Leigh Temple MD  [facility]  Family Practice  [facility]  [phone]    For any issues my office # is [phone]

## 2018-07-05 NOTE — MR AVS SNAPSHOT
"              After Visit Summary   2018    Estelle Gomez    MRN: [id number]           Patient Information     Date Of Birth          1970        Visit Information        Provider Department      2018 1:45 PM eLigh Temple MD [facility]        Today's Diagnoses     Follow-up examination following surgery    -  1    Moderate pain           Follow-ups after your visit        Who to contact     If you have questions or need follow up information about today's clinic visit or your schedule please contact [facility] directly at [phone].  Normal or non-critical lab and imaging results will be communicated to you by Luciduxhart, letter or phone within 4 business days after the clinic has received the results. If you do not hear from us within 7 days, please contact the clinic through iikot or phone. If you have a critical or abnormal lab result, we will notify you by phone as soon as possible.  Submit refill requests through Bionostra or call your pharmacy and they will forward the refill request to us. Please allow 3 business days for your refill to be completed.          Additional Information About Your Visit        MyChart Information     Bionostra lets you send messages to your doctor, view your test results, renew your prescriptions, schedule appointments and more. To sign up, go to [email or web address] . Click on \"Log in\" on the left side of the screen, which will take you to the Welcome page. Then click on \"Sign up Now\" on the right side of the page.     You will be asked to enter the access code listed below, as well as some personal information. Please follow the directions to create your username and password.     Your access code is: [email or web address]  Expires: 10/7/2018  7:55 AM     Your access code will  in 90 days. If you need help or a new code, please call your [place] clinic or [phone].        Care EveryWhere ID     This is your Care " "EveryWhere ID. This could be used by other organizations to access your [facility] medical records  [id number]        Your Vitals Were     Pulse Respirations Height Last Period Pulse Oximetry BMI (Body Mass Index)    94 16 1.765 m (5' 9.5\") 06/04/2012 94% 20.67 kg/m2       Blood Pressure from Last 3 Encounters:   07/05/18 120/84   10/25/17 90/60   08/23/17 112/74    Weight from Last 3 Encounters:   07/05/18 64.4 kg (142 lb)   10/25/17 64 kg (141 lb)   08/23/17 65.3 kg (144 lb)              We Performed the Following     DEPRESSION ACTION PLAN (DAP)          Today's Medication Changes          These changes are accurate as of 7/5/18 11:59 PM.  If you have any questions, ask your nurse or doctor.               Start taking these medicines.        Dose/Directions    oxyCODONE-acetaminophen 7.5-325 MG per tablet   Commonly known as:  PERCOCET   Used for:  Moderate pain   Started by:  Leigh Temple MD        1/2-1 po every 4-6 hours prn pain   Quantity:  30 tablet   Refills:  0            Where to get your medicines      Some of these will need a paper prescription and others can be bought over the counter.  Ask your nurse if you have questions.     Bring a paper prescription for each of these medications     oxyCODONE-acetaminophen 7.5-325 MG per tablet               Information about OPIOIDS     PRESCRIPTION OPIOIDS: WHAT YOU NEED TO KNOW   We gave you an opioid (narcotic) pain medicine. It is important to manage your pain, but opioids are not always the best choice. You should first try all the other options your care team gave you. Take this medicine for as short a time (and as few doses) as possible.     These medicines have risks:    DO NOT drive when on new or higher doses of pain medicine. These medicines can affect your alertness and reaction times, and you could be arrested for driving under the influence (DUI). If you need to use opioids long-term, talk to your care team about driving.    DO NOT " operate heave machinery    DO NOT do any other dangerous activities while taking these medicines.     DO NOT drink any alcohol while taking these medicines.      If the opioid prescribed includes acetaminophen, DO NOT take with any other medicines that contain acetaminophen. Read all labels carefully. Look for the word  acetaminophen  or  Tylenol.  Ask your pharmacist if you have questions or are unsure.    You can get addicted to pain medicines, especially if you have a history of addiction (chemical, alcohol or substance dependence). Talk to your care team about ways to reduce this risk.    Store your pills in a secure place, locked if possible. We will not replace any lost or stolen medicine. If you don t finish your medicine, please throw away (dispose) as directed by your pharmacist. The [place] Pollution Control Agency has more information about safe disposal: https://www.pca.[facility].[place].us/living-green/managing-unwanted-medications.     All opioids tend to cause constipation. Drink plenty of water and eat foods that have a lot of fiber, such as fruits, vegetables, prune juice, apple juice and high-fiber cereal. Take a laxative (Miralax, milk of magnesia, Colace, Senna) if you don t move your bowels at least every other day.          Primary Care Provider    None Specified       No primary provider on file.        Equal Access to Services     RAMON SHELLEY AH: Anitra Colón, marbella stephen, jordon mckeon, eric mora. So [facility] [phone].    ATENCIÓN: Si habla español, tiene a anne disposición servicios gratuitos de asistencia lingüística. Llame al [phone].    We comply with applicable federal civil rights laws and [place] laws. We do not discriminate on the basis of race, color, national origin, age, disability, sex, sexual orientation, or gender identity.            Thank you!     Thank you for choosing [facility]  for your care. Our  "goal is always to provide you with excellent care. Hearing back from our patients is one way we can continue to improve our services. Please take a few minutes to complete the written survey that you may receive in the mail after your visit with us. Thank you!             Your Updated Medication List - Protect others around you: Learn how to safely use, store and throw away your medicines at www.disposemymeds.org.          This list is accurate as of 7/5/18 11:59 PM.  Always use your most recent med list.                   Brand Name Dispense Instructions for use Diagnosis    ALPRAZolam 1 MG tablet    XANAX    120 tablet    TAKE ONE TABLET BY MOUTH 4 TIMES DAILY AS NEEDED    Generalized anxiety disorder       amphetamine-dextroamphetamine 10 MG per tablet    ADDERALL    90 tablet    Take 1 tablet (10 mg) by mouth 3 times daily    ADD (attention deficit disorder)       cyanocobalamin 1000 MCG/ML injection    VITAMIN B12    25 mL    INJECT 1 ML INTRAMUSCULARLY EVERY 7 DAYS AS DIRECTED    Tachycardia       Insulin Syringe 29G X 1/2\" 1 ML Misc    BD insulin syringe    50 each    Use one syringe weekly for B12 injection.    Recurrent major depressive disorder, in partial remission (H)       lidocaine-prilocaine cream    EMLA    30 g    APPLY TOPICALLY TO AFFECTED AREA(S) AS NEEDED PRIOR TO TREATMENT    Encounter for medication refill       omeprazole 20 MG CR capsule    priLOSEC    180 capsule    TAKE 1 CAPSULE (20 MG) BY MOUTH 2 TIMES DAILY    Nausea       ondansetron 4 MG ODT tab    ZOFRAN-ODT    20 tablet    TAKE 1-2 TABLETS (4-8 MG) BY MOUTH EVERY 8 HOURS AS NEEDED FOR NAUSEA    Nausea       ondansetron 4 MG tablet    ZOFRAN    180 tablet    Take 1 tablet (4 mg) by mouth every 8 hours as needed for nausea    Nausea       oxyCODONE-acetaminophen 7.5-325 MG per tablet    PERCOCET    30 tablet    1/2-1 po every 4-6 hours prn pain    Moderate pain       * propranolol 10 MG tablet    INDERAL    60 tablet    TAKE 1 TABLET " (10 MG) BY MOUTH 2 TIMES DAILY    Tachycardia       * propranolol 10 MG tablet    INDERAL    60 tablet    TAKE 1 TABLET (10 MG) BY MOUTH 2 TIMES DAILY    Tachycardia       * traZODone 50 MG tablet    DESYREL    90 tablet    TAKE ONE TABLET BY MOUTH NIGHTLY AT BEDTIME AS NEEDED FOR SLEEP    Encounter for medication refill       * traZODone 100 MG tablet    DESYREL    180 tablet    Take 1 tablet (100 mg) by mouth 2 times daily    Encounter for medication refill       varenicline 0.5 MG X 11 & 1 MG X 42 tablet    CHANTIX STARTING MONTH SATHISH    53 tablet    Take 0.5 mg tab daily for 3 days, then 0.5 mg tab twice daily for 4 days, then 1 mg twice daily.    Encounter for smoking cessation counseling       * VENTOLIN  (90 Base) MCG/ACT Inhaler   Generic drug:  albuterol     18 g    INHALE TWO PUFFS BY MOUTH EVERY SIX HOURS AS NEEDED FOR SHORTNESS OF BREATH    Encounter for medication refill       * albuterol 108 (90 Base) MCG/ACT Inhaler    PROAIR HFA/PROVENTIL HFA/VENTOLIN HFA    3 Inhaler    Inhale 2 puffs into the lungs every 6 hours as needed for shortness of breath / dyspnea or wheezing    Mild intermittent asthma without complication       * Notice:  This list has 6 medication(s) that are the same as other medications prescribed for you. Read the directions carefully, and ask your doctor or other care provider to review them with you.

## 2018-07-05 NOTE — LETTER
My Asthma Action Plan  Name: Estelel Gomez   YOB: 1970  Date: 7/5/2018   My doctor: Leigh Temple MD   My clinic: [facility]        My Control Medicine: Ventolin HFA  My Rescue Medicine: Albuterol (Proair/Ventolin/Proventil) inhaler 108 MCG   My Asthma Severity: intermittent  Avoid your asthma triggers: Patient is unaware of triggers               GREEN ZONE   Good Control    I feel good    No cough or wheeze    Can work, sleep and play without asthma symptoms       Take your asthma control medicine every day.     1. If exercise triggers your asthma, take your rescue medication    15 minutes before exercise or sports, and    During exercise if you have asthma symptoms  2. Spacer to use with inhaler: If you have a spacer, make sure to use it with your inhaler             YELLOW ZONE Getting Worse  I have ANY of these:    I do not feel good    Cough or wheeze    Chest feels tight    Wake up at night   1. Keep taking your Green Zone medications  2. Start taking your rescue medicine:    every 20 minutes for up to 1 hour. Then every 4 hours for 24-48 hours.  3. If you stay in the Yellow Zone for more than 12-24 hours, contact your doctor.  4. If you do not return to the Green Zone in 12-24 hours or you get worse, start taking your oral steroid medicine if prescribed by your provider.           RED ZONE Medical Alert - Get Help  I have ANY of these:    I feel awful    Medicine is not helping    Breathing getting harder    Trouble walking or talking    Nose opens wide to breathe       1. Take your rescue medicine NOW  2. If your provider has prescribed an oral steroid medicine, start taking it NOW  3. Call your doctor NOW  4. If you are still in the Red Zone after 20 minutes and you have not reached your doctor:    Take your rescue medicine again and    Call 911 or go to the emergency room right away    See your regular doctor within 2 weeks of an Emergency Room or Urgent Care visit  for follow-up treatment.          Annual Reminders:  Meet with Asthma Educator,  Flu Shot in the Fall, consider Pneumonia Vaccination for patients with asthma (aged 19 and older).    Pharmacy:    [facility] [id number] [facility] [place], [place] - [address]                      Asthma Triggers  How To Control Things That Make Your Asthma Worse    Triggers are things that make your asthma worse.  Look at the list below to help you find your triggers and what you can do about them.  You can help prevent asthma flare-ups by staying away from your triggers.      Trigger                                                          What you can do   Cigarette Smoke  Tobacco smoke can make asthma worse. Do not allow smoking in your home, car or around you.  Be sure no one smokes at a child s day care or school.  If you smoke, ask your health care provider for ways to help you quit.  Ask family members to quit too.  Ask your health care provider for a referral to Quit Plan to help you quit smoking, or call 3-091-022-PLAN.     Colds, Flu, Bronchitis  These are common triggers of asthma. Wash your hands often.  Don t touch your eyes, nose or mouth.  Get a flu shot every year.     Dust Mites  These are tiny bugs that live in cloth or carpet. They are too small to see. Wash sheets and blankets in hot water every week.   Encase pillows and mattress in dust mite proof covers.  Avoid having carpet if you can. If you have carpet, vacuum weekly.   Use a dust mask and HEPA vacuum.   Pollen and Outdoor Mold  Some people are allergic to trees, grass, or weed pollen, or molds. Try to keep your windows closed.  Limit time out doors when pollen count is high.   Ask you health care provider about taking medicine during allergy season.     Animal Dander  Some people are allergic to skin flakes, urine or saliva from pets with fur or feathers. Keep pets with fur or feathers out of  your home.    If you can t keep the pet outdoors, then keep the pet out of your bedroom.  Keep the bedroom door closed.  Keep pets off cloth furniture and away from stuffed toys.     Mice, Rats, and Cockroaches  Some people are allergic to the waste from these pests.   Cover food and garbage.  Clean up spills and food crumbs.  Store grease in the refrigerator.   Keep food out of the bedroom.   Indoor Mold  This can be a trigger if your home has high moisture. Fix leaking faucets, pipes, or other sources of water.   Clean moldy surfaces.  Dehumidify basement if it is damp and smelly.   Smoke, Strong Odors, and Sprays  These can reduce air quality. Stay away from strong odors and sprays, such as perfume, powder, hair spray, paints, smoke incense, paint, cleaning products, candles and new carpet.   Exercise or Sports  Some people with asthma have this trigger. Be active!  Ask your doctor about taking medicine before sports or exercise to prevent symptoms.    Warm up for 5-10 minutes before and after sports or exercise.     Other Triggers of Asthma  Cold air:  Cover your nose and mouth with a scarf.  Sometimes laughing or crying can be a trigger.  Some medicines and food can trigger asthma.

## 2018-07-30 DIAGNOSIS — R11.0 NAUSEA: ICD-10-CM

## 2018-07-30 DIAGNOSIS — F41.1 GENERALIZED ANXIETY DISORDER: ICD-10-CM

## 2018-07-30 RX ORDER — ONDANSETRON 4 MG/1
TABLET, ORALLY DISINTEGRATING ORAL
Qty: 20 TABLET | Refills: 1 | Status: SHIPPED | OUTPATIENT
Start: 2018-07-30 | End: 2018-08-13

## 2018-07-31 NOTE — TELEPHONE ENCOUNTER
Received request from pharmacy for refill on alprazolam 1mg QID prn #120.   Previous patient of Dr. Lino's (now retired).   Last visit regarding anxiety was 10/25/2017 with Dr. Lino.     Last visit in clinic was 18 with Dr. Temple regarding post-breast surgery visit.     No future appts scheduled.     Controlled substance agreement due - last signed 17.   No toxassure on file.     Per [place]  last fills for alprazolam 1m18 #120  - rx written by Dr. Lino 3/30/18  5/22/18  #120 - rx written by Dr. Lino 3/30/18  2/27/18 #120 - Dr. Lino   17 #120-Dr. Lino   17 #120 -Dr. Lino      Patient will need to schedule med check with MD.

## 2018-08-01 RX ORDER — ALPRAZOLAM 1 MG
TABLET ORAL
Qty: 60 TABLET | Refills: 0 | COMMUNITY
Start: 2018-08-01 | End: 2018-08-13

## 2018-08-01 NOTE — TELEPHONE ENCOUNTER
Left message for patient regarding refill request for alprazolam 1mg QID prn #120. Patient needs to call and make med check appt with provider (30 minute appt). When appt is scheduled will talk to Dr. Temple regarding ordering enough to get to appt.  Mi Valverde RN

## 2018-08-01 NOTE — TELEPHONE ENCOUNTER
Spoke with patient. Appt scheduled with Dr. Temple for 8/13/18 to discuss anxiety and alprazolam. Patient states long hx of mental health issues and many meds tried/failed.    Plan: ok per Dr. Temple for #60 (2 week supply) of alprazolam to make it to 8/13 appt. Rx phoned to pharmacy and patient informed.  Mi Valverde RN

## 2018-08-13 ENCOUNTER — OFFICE VISIT (OUTPATIENT)
Dept: FAMILY MEDICINE | Facility: CLINIC | Age: 48
End: 2018-08-13

## 2018-08-13 VITALS
BODY MASS INDEX: 20.38 KG/M2 | HEART RATE: 76 BPM | WEIGHT: 140 LBS | DIASTOLIC BLOOD PRESSURE: 68 MMHG | SYSTOLIC BLOOD PRESSURE: 100 MMHG

## 2018-08-13 DIAGNOSIS — Z71.6 ENCOUNTER FOR SMOKING CESSATION COUNSELING: ICD-10-CM

## 2018-08-13 DIAGNOSIS — F60.3 BORDERLINE PERSONALITY DISORDER (H): ICD-10-CM

## 2018-08-13 DIAGNOSIS — Z72.0 TOBACCO ABUSE: ICD-10-CM

## 2018-08-13 DIAGNOSIS — F51.04 PSYCHOPHYSIOLOGICAL INSOMNIA: Primary | ICD-10-CM

## 2018-08-13 DIAGNOSIS — R11.0 NAUSEA: ICD-10-CM

## 2018-08-13 DIAGNOSIS — E53.8 VITAMIN B12 DEFICIENCY (NON ANEMIC): ICD-10-CM

## 2018-08-13 PROCEDURE — 99406 BEHAV CHNG SMOKING 3-10 MIN: CPT | Performed by: FAMILY MEDICINE

## 2018-08-13 PROCEDURE — 99214 OFFICE O/P EST MOD 30 MIN: CPT | Mod: 25 | Performed by: FAMILY MEDICINE

## 2018-08-13 RX ORDER — ALPRAZOLAM 1 MG
TABLET ORAL
Qty: 90 TABLET | Refills: 0 | Status: SHIPPED | OUTPATIENT
Start: 2018-10-12 | End: 2021-04-08

## 2018-08-13 RX ORDER — DEXTROAMPHETAMINE SACCHARATE, AMPHETAMINE ASPARTATE, DEXTROAMPHETAMINE SULFATE AND AMPHETAMINE SULFATE 2.5; 2.5; 2.5; 2.5 MG/1; MG/1; MG/1; MG/1
10 TABLET ORAL 3 TIMES DAILY
Qty: 90 TABLET | Refills: 0 | Status: CANCELLED | OUTPATIENT
Start: 2018-08-13 | End: 2018-09-12

## 2018-08-13 RX ORDER — ALPRAZOLAM 1 MG
TABLET ORAL
Qty: 90 TABLET | Refills: 0 | Status: SHIPPED | OUTPATIENT
Start: 2018-09-12 | End: 2018-08-13

## 2018-08-13 RX ORDER — ALPRAZOLAM 1 MG
TABLET ORAL
Qty: 90 TABLET | Refills: 0 | Status: SHIPPED | OUTPATIENT
Start: 2018-08-13 | End: 2018-08-13

## 2018-08-13 RX ORDER — CYANOCOBALAMIN 1000 UG/ML
1 INJECTION, SOLUTION INTRAMUSCULAR; SUBCUTANEOUS WEEKLY
Qty: 25 ML | Refills: 0 | Status: SHIPPED | OUTPATIENT
Start: 2018-08-13 | End: 2021-04-08

## 2018-08-13 RX ORDER — VARENICLINE TARTRATE 0.5 MG/1
0.5 TABLET, FILM COATED ORAL 2 TIMES DAILY
Qty: 60 TABLET | Refills: 1 | Status: SHIPPED | OUTPATIENT
Start: 2018-08-13 | End: 2021-04-08

## 2018-08-13 RX ORDER — IBUPROFEN 200 MG
TABLET ORAL
Qty: 50 EACH | Refills: 0 | Status: SHIPPED | OUTPATIENT
Start: 2018-08-13

## 2018-08-13 RX ORDER — TRAZODONE HYDROCHLORIDE 50 MG/1
TABLET, FILM COATED ORAL
Qty: 90 TABLET | Refills: 3 | Status: SHIPPED | OUTPATIENT
Start: 2018-08-13 | End: 2021-04-08

## 2018-08-13 RX ORDER — TRAZODONE HYDROCHLORIDE 100 MG/1
100 TABLET ORAL AT BEDTIME
Qty: 90 TABLET | Refills: 3 | Status: SHIPPED | OUTPATIENT
Start: 2018-08-13 | End: 2021-04-08

## 2018-08-13 RX ORDER — ONDANSETRON 4 MG/1
TABLET, ORALLY DISINTEGRATING ORAL
Qty: 20 TABLET | Refills: 1 | Status: SHIPPED | OUTPATIENT
Start: 2018-08-13 | End: 2021-04-08

## 2018-08-13 NOTE — MR AVS SNAPSHOT
"              After Visit Summary   2018    Estelle Gomez    MRN: [id number]           Patient Information     Date Of Birth          1970        Visit Information        Provider Department      2018 12:45 PM Leigh Temple MD [facility]        Today's Diagnoses     Psychophysiological insomnia    -  1    Borderline personality disorder        Encounter for smoking cessation counseling        Tobacco abuse        Nausea        Vitamin B12 deficiency (non anemic)           Follow-ups after your visit        Who to contact     If you have questions or need follow up information about today's clinic visit or your schedule please contact [facility] directly at [phone].  Normal or non-critical lab and imaging results will be communicated to you by buySAFEhart, letter or phone within 4 business days after the clinic has received the results. If you do not hear from us within 7 days, please contact the clinic through buySAFEhart or phone. If you have a critical or abnormal lab result, we will notify you by phone as soon as possible.  Submit refill requests through [employer] or call your pharmacy and they will forward the refill request to us. Please allow 3 business days for your refill to be completed.          Additional Information About Your Visit        MyChart Information     [employer] lets you send messages to your doctor, view your test results, renew your prescriptions, schedule appointments and more. To sign up, go to [email or web address] . Click on \"Log in\" on the left side of the screen, which will take you to the Welcome page. Then click on \"Sign up Now\" on the right side of the page.     You will be asked to enter the access code listed below, as well as some personal information. Please follow the directions to create your username and password.     Your access code is: [email or web address]  Expires: 10/7/2018  7:55 AM     Your access code will  in 90 days. " If you need help or a new code, please call your [facility] clinic or [phone].        Care EveryWhere ID     This is your Care EveryWhere ID. This could be used by other organizations to access your [facility] medical records  [id number]        Your Vitals Were     Pulse Last Period BMI (Body Mass Index)             76 06/04/2012 20.38 kg/m2          Blood Pressure from Last 3 Encounters:   08/13/18 100/68   07/05/18 120/84   10/25/17 90/60    Weight from Last 3 Encounters:   08/13/18 63.5 kg (140 lb)   07/05/18 64.4 kg (142 lb)   10/25/17 64 kg (141 lb)              We Performed the Following     SMOKING CESSATION COUNSELING 3-10 MIN          Today's Medication Changes          These changes are accurate as of 8/13/18  9:54 PM.  If you have any questions, ask your nurse or doctor.               Start taking these medicines.        Dose/Directions    ALPRAZolam 1 MG tablet   Commonly known as:  XANAX   Used for:  Psychophysiological insomnia   Started by:  Leigh Temple MD        Start taking on:  10/12/2018   Three tabs po at hs   Quantity:  90 tablet   Refills:  0         These medicines have changed or have updated prescriptions.        Dose/Directions    cyanocobalamin 1000 MCG/ML injection   Commonly known as:  VITAMIN B12   This may have changed:  See the new instructions.   Used for:  Vitamin B12 deficiency (non anemic)   Changed by:  Leigh Temple MD        Dose:  1 mL   Inject 1 mL (1,000 mcg) into the muscle once a week   Quantity:  25 mL   Refills:  0       * traZODone 100 MG tablet   Commonly known as:  DESYREL   This may have changed:  when to take this   Used for:  Psychophysiological insomnia   Changed by:  Leigh Temple MD        Dose:  100 mg   Take 1 tablet (100 mg) by mouth At Bedtime   Quantity:  90 tablet   Refills:  3       * traZODone 50 MG tablet   Commonly known as:  DESYREL   This may have changed:  Another medication with the same name was changed. Make sure  "you understand how and when to take each.   Used for:  Psychophysiological insomnia   Changed by:  Leigh Temple MD        TAKE ONE TABLET BY MOUTH NIGHTLY AT BEDTIME AS NEEDED FOR SLEEP   Quantity:  90 tablet   Refills:  3       * varenicline 0.5 MG X 11 & 1 MG X 42 tablet   Commonly known as:  CHANTIX STARTING MONTH PAK   This may have changed:  Another medication with the same name was added. Make sure you understand how and when to take each.   Used for:  Encounter for smoking cessation counseling   Changed by:  Leigh Temple MD        Take 0.5 mg tab daily for 3 days, then 0.5 mg tab twice daily for 4 days, then 1 mg twice daily.   Quantity:  53 tablet   Refills:  0       * varenicline 0.5 MG tablet   Commonly known as:  CHANTIX   This may have changed:  You were already taking a medication with the same name, and this prescription was added. Make sure you understand how and when to take each.   Used for:  Tobacco abuse   Changed by:  Leigh Temple MD        Dose:  0.5 mg   Take 1 tablet (0.5 mg) by mouth 2 times daily   Quantity:  60 tablet   Refills:  1       * Notice:  This list has 4 medication(s) that are the same as other medications prescribed for you. Read the directions carefully, and ask your doctor or other care provider to review them with you.         Where to get your medicines      These medications were sent to [facility] [id number] [place] [address]  [address] [place] [address]     Phone:  [phone]     cyanocobalamin 1000 MCG/ML injection    Insulin Syringe 29G X 1/2\" 1 ML Misc    omeprazole 20 MG CR capsule    ondansetron 4 MG ODT tab    traZODone 100 MG tablet    traZODone 50 MG tablet    varenicline 0.5 MG tablet         Some of these will need a paper prescription and others can be bought over the counter.  Ask your nurse if you have questions.     Bring a paper prescription for each of these medications     ALPRAZolam 1 MG tablet " "               Primary Care Provider Office Phone # Fax #    Leigh Patricia Temple [phone] [address] [place] [address]        Equal Access to Services     RAMON SHELLEY : Hadzheng stephie dale vidyao Sosharonali, waaxda luqadaha, qaybta kaalmada adekary, eric burgess laAngusdorothy mora. So [facility] [phone].    ATENCIÓN: Si habla español, tiene a anne disposición servicios gratuitos de asistencia lingüística. Llame al [phone].    We comply with applicable federal civil rights laws and [place] laws. We do not discriminate on the basis of race, color, national origin, age, disability, sex, sexual orientation, or gender identity.            Thank you!     Thank you for choosing [facility]  for your care. Our goal is always to provide you with excellent care. Hearing back from our patients is one way we can continue to improve our services. Please take a few minutes to complete the written survey that you may receive in the mail after your visit with us. Thank you!             Your Updated Medication List - Protect others around you: Learn how to safely use, store and throw away your medicines at www.disposemymeds.org.          This list is accurate as of 8/13/18  9:54 PM.  Always use your most recent med list.                   Brand Name Dispense Instructions for use Diagnosis    albuterol 108 (90 Base) MCG/ACT inhaler    PROAIR HFA/PROVENTIL HFA/VENTOLIN HFA    3 Inhaler    Inhale 2 puffs into the lungs every 6 hours as needed for shortness of breath / dyspnea or wheezing    Mild intermittent asthma without complication       ALPRAZolam 1 MG tablet   Start taking on:  10/12/2018    XANAX    90 tablet    Three tabs po at hs    Psychophysiological insomnia       cyanocobalamin 1000 MCG/ML injection    VITAMIN B12    25 mL    Inject 1 mL (1,000 mcg) into the muscle once a week    Vitamin B12 deficiency (non anemic)       Insulin Syringe 29G X 1/2\" 1 ML Misc "    BD insulin syringe    50 each    Use one syringe weekly for B12 injection.    Vitamin B12 deficiency (non anemic)       lidocaine-prilocaine cream    EMLA    30 g    APPLY TOPICALLY TO AFFECTED AREA(S) AS NEEDED PRIOR TO TREATMENT    Encounter for medication refill       omeprazole 20 MG CR capsule    priLOSEC    180 capsule    Take 1 capsule (20 mg) by mouth 2 times daily    Nausea       ondansetron 4 MG ODT tab    ZOFRAN-ODT    20 tablet    TAKE 1-2 TABLETS (4-8 MG) BY MOUTH EVERY 8 HOURS AS NEEDED FOR NAUSEA    Nausea       oxyCODONE-acetaminophen 7.5-325 MG per tablet    PERCOCET    30 tablet    1/2-1 po every 4-6 hours prn pain    Moderate pain       propranolol 10 MG tablet    INDERAL    60 tablet    TAKE 1 TABLET (10 MG) BY MOUTH 2 TIMES DAILY    Tachycardia       * traZODone 100 MG tablet    DESYREL    90 tablet    Take 1 tablet (100 mg) by mouth At Bedtime    Psychophysiological insomnia       * traZODone 50 MG tablet    DESYREL    90 tablet    TAKE ONE TABLET BY MOUTH NIGHTLY AT BEDTIME AS NEEDED FOR SLEEP    Psychophysiological insomnia       * varenicline 0.5 MG X 11 & 1 MG X 42 tablet    CHANTIX STARTING MONTH SATHISH    53 tablet    Take 0.5 mg tab daily for 3 days, then 0.5 mg tab twice daily for 4 days, then 1 mg twice daily.    Encounter for smoking cessation counseling       * varenicline 0.5 MG tablet    CHANTIX    60 tablet    Take 1 tablet (0.5 mg) by mouth 2 times daily    Tobacco abuse       * Notice:  This list has 4 medication(s) that are the same as other medications prescribed for you. Read the directions carefully, and ask your doctor or other care provider to review them with you.
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
## 2018-08-15 DIAGNOSIS — R00.0 TACHYCARDIA: ICD-10-CM

## 2018-08-15 RX ORDER — PROPRANOLOL HYDROCHLORIDE 10 MG/1
TABLET ORAL
Qty: 60 TABLET | Refills: 1 | Status: SHIPPED | OUTPATIENT
Start: 2018-08-15 | End: 2018-10-09

## 2018-08-15 NOTE — TELEPHONE ENCOUNTER
propranolol (INDERAL) 10 MG   LAST  Med check 10/25/17   Seen by you 8/13/18 for other  last labs(for RX) 8/23/17  Next  appt scheduled =  None - due for heart med ck  Jaqui Fountain MA

## 2018-10-09 DIAGNOSIS — R00.0 TACHYCARDIA: ICD-10-CM

## 2018-10-09 RX ORDER — PROPRANOLOL HYDROCHLORIDE 10 MG/1
TABLET ORAL
Qty: 180 TABLET | Refills: 3 | Status: SHIPPED | OUTPATIENT
Start: 2018-10-09 | End: 2021-04-08

## 2019-01-03 DIAGNOSIS — Z76.0 ENCOUNTER FOR MEDICATION REFILL: ICD-10-CM

## 2019-01-03 RX ORDER — LIDOCAINE/PRILOCAINE 2.5 %-2.5%
CREAM (GRAM) TOPICAL
Qty: 30 G | Refills: 1 | Status: SHIPPED | OUTPATIENT
Start: 2019-01-03 | End: 2021-04-08

## 2021-04-07 ENCOUNTER — NURSE TRIAGE (OUTPATIENT)
Dept: NURSING | Facility: CLINIC | Age: 51
End: 2021-04-07
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
## 2021-04-08 ENCOUNTER — HOSPITAL ENCOUNTER (EMERGENCY)
Facility: CLINIC | Age: 51
Discharge: HOME | End: 2021-04-08
Attending: EMERGENCY MEDICINE | Admitting: EMERGENCY MEDICINE
Payer: COMMERCIAL

## 2021-04-08 VITALS
SYSTOLIC BLOOD PRESSURE: 125 MMHG | TEMPERATURE: 97.7 F | BODY MASS INDEX: 20.76 KG/M2 | OXYGEN SATURATION: 98 % | WEIGHT: 145 LBS | DIASTOLIC BLOOD PRESSURE: 89 MMHG | HEART RATE: 105 BPM | HEIGHT: 70 IN | RESPIRATION RATE: 18 BRPM

## 2021-04-08 DIAGNOSIS — R20.9 SKIN SENSATION DISTURBANCE: ICD-10-CM

## 2021-04-08 PROCEDURE — 99282 EMERGENCY DEPT VISIT SF MDM: CPT

## 2021-04-08 ASSESSMENT — MIFFLIN-ST. JEOR: SCORE: 1367.97

## 2021-04-08 ASSESSMENT — ENCOUNTER SYMPTOMS: NERVOUS/ANXIOUS: 1
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
## 2021-04-08 NOTE — DISCHARGE INSTRUCTIONS
I do not see evidence that makes me concerned for a parasite at this time.  Please follow up with your primary care doctor.  You may also consider seeing your dermatologist for another evaluation.

## 2021-04-08 NOTE — ED TRIAGE NOTES
Pt states that she has a parasite. She says she has seen movement under her skin all over her body. She has some medications that would indicate she was being treated for scabies. She adamantly denies alcohol or drug use.

## 2021-04-19 ENCOUNTER — APPOINTMENT (OUTPATIENT)
Dept: URBAN - METROPOLITAN AREA CLINIC 256 | Age: 51
Setting detail: DERMATOLOGY
End: 2021-04-19

## 2021-04-19 VITALS — RESPIRATION RATE: 16 BRPM | HEIGHT: 70 IN | WEIGHT: 142 LBS

## 2021-04-19 DIAGNOSIS — F45.8 OTHER SOMATOFORM DISORDERS: ICD-10-CM

## 2021-04-19 PROCEDURE — OTHER COUNSELING: OTHER

## 2021-04-19 PROCEDURE — 99204 OFFICE O/P NEW MOD 45 MIN: CPT

## 2021-04-19 PROCEDURE — OTHER ADDITIONAL NOTES: OTHER

## 2021-04-19 ASSESSMENT — LOCATION ZONE DERM
LOCATION ZONE: TRUNK
LOCATION ZONE: FACE

## 2021-04-19 ASSESSMENT — LOCATION SIMPLE DESCRIPTION DERM
LOCATION SIMPLE: INFERIOR FOREHEAD
LOCATION SIMPLE: RIGHT UPPER BACK

## 2021-04-19 ASSESSMENT — LOCATION DETAILED DESCRIPTION DERM
LOCATION DETAILED: INFERIOR MID FOREHEAD
LOCATION DETAILED: RIGHT SUPERIOR MEDIAL UPPER BACK

## 2021-04-19 NOTE — PROCEDURE: ADDITIONAL NOTES
Render Risk Assessment In Note?: no
Additional Notes: -Patient requested skin to be scraped and to wait for parasites to come out. \\n-PSC waited 20 minutes and didn’t see any bugs present.\\n-Referring patient to a psychiatrist Dr. Gillis.
Detail Level: Zone

## 2025-02-22 ENCOUNTER — HOSPITAL ENCOUNTER (EMERGENCY)
Facility: CLINIC | Age: 55
Discharge: HOME | End: 2025-02-23
Attending: SOCIAL WORKER | Admitting: SOCIAL WORKER

## 2025-02-22 DIAGNOSIS — Z71.1 CONCERN ABOUT SKIN DISEASE WITHOUT DIAGNOSIS: ICD-10-CM

## 2025-02-22 DIAGNOSIS — Z71.1: ICD-10-CM

## 2025-02-22 LAB
BASOPHILS # BLD AUTO: 0.1 10E3/UL (ref 0–0.2)
BASOPHILS NFR BLD AUTO: 1 %
EOSINOPHIL # BLD AUTO: 0.1 10E3/UL (ref 0–0.7)
EOSINOPHIL NFR BLD AUTO: 1 %
ERYTHROCYTE [DISTWIDTH] IN BLOOD BY AUTOMATED COUNT: 13.2 % (ref 10–15)
HCT VFR BLD AUTO: 46.8 % (ref 35–47)
HGB BLD-MCNC: 15.5 G/DL (ref 11.7–15.7)
IMM GRANULOCYTES # BLD: 0.1 10E3/UL
IMM GRANULOCYTES NFR BLD: 1 %
LYMPHOCYTES # BLD AUTO: 1.1 10E3/UL (ref 0.8–5.3)
LYMPHOCYTES NFR BLD AUTO: 13 %
MCH RBC QN AUTO: 30.2 PG (ref 26.5–33)
MCHC RBC AUTO-ENTMCNC: 33.1 G/DL (ref 31.5–36.5)
MCV RBC AUTO: 91 FL (ref 78–100)
MONOCYTES # BLD AUTO: 0.6 10E3/UL (ref 0–1.3)
MONOCYTES NFR BLD AUTO: 7 %
NEUTROPHILS # BLD AUTO: 7 10E3/UL (ref 1.6–8.3)
NEUTROPHILS NFR BLD AUTO: 78 %
NRBC # BLD AUTO: 0 10E3/UL
NRBC BLD AUTO-RTO: 0 /100
PLATELET # BLD AUTO: 314 10E3/UL (ref 150–450)
RBC # BLD AUTO: 5.13 10E6/UL (ref 3.8–5.2)
WBC # BLD AUTO: 8.9 10E3/UL (ref 4–11)

## 2025-02-22 PROCEDURE — 93005 ELECTROCARDIOGRAM TRACING: CPT

## 2025-02-22 PROCEDURE — 82310 ASSAY OF CALCIUM: CPT | Performed by: SOCIAL WORKER

## 2025-02-22 PROCEDURE — 85004 AUTOMATED DIFF WBC COUNT: CPT | Performed by: SOCIAL WORKER

## 2025-02-22 PROCEDURE — 99284 EMERGENCY DEPT VISIT MOD MDM: CPT

## 2025-02-22 PROCEDURE — 84443 ASSAY THYROID STIM HORMONE: CPT | Performed by: SOCIAL WORKER

## 2025-02-22 PROCEDURE — 80048 BASIC METABOLIC PNL TOTAL CA: CPT | Performed by: SOCIAL WORKER

## 2025-02-22 PROCEDURE — 36415 COLL VENOUS BLD VENIPUNCTURE: CPT | Performed by: SOCIAL WORKER

## 2025-02-22 RX ORDER — OLANZAPINE 5 MG/1
10 TABLET, ORALLY DISINTEGRATING ORAL AT BEDTIME
Status: DISCONTINUED | OUTPATIENT
Start: 2025-02-22 | End: 2025-02-23 | Stop reason: HOSPADM

## 2025-02-22 RX ORDER — OLANZAPINE 10 MG/2ML
10 INJECTION, POWDER, FOR SOLUTION INTRAMUSCULAR AT BEDTIME
Status: DISCONTINUED | OUTPATIENT
Start: 2025-02-22 | End: 2025-02-23 | Stop reason: HOSPADM

## 2025-02-22 ASSESSMENT — ACTIVITIES OF DAILY LIVING (ADL): ADLS_ACUITY_SCORE: 41

## 2025-02-23 VITALS
RESPIRATION RATE: 16 BRPM | WEIGHT: 191.36 LBS | DIASTOLIC BLOOD PRESSURE: 96 MMHG | OXYGEN SATURATION: 94 % | BODY MASS INDEX: 27.46 KG/M2 | TEMPERATURE: 97.7 F | SYSTOLIC BLOOD PRESSURE: 145 MMHG | HEART RATE: 110 BPM

## 2025-02-23 LAB
ALBUMIN UR-MCNC: NEGATIVE MG/DL
AMPHETAMINES UR QL SCN: ABNORMAL
ANION GAP SERPL CALCULATED.3IONS-SCNC: 13 MMOL/L (ref 7–15)
APPEARANCE UR: CLEAR
BARBITURATES UR QL SCN: ABNORMAL
BENZODIAZ UR QL SCN: ABNORMAL
BILIRUB UR QL STRIP: NEGATIVE
BUN SERPL-MCNC: 12.7 MG/DL (ref 6–20)
BZE UR QL SCN: ABNORMAL
CALCIUM SERPL-MCNC: 8.5 MG/DL (ref 8.8–10.4)
CANNABINOIDS UR QL SCN: ABNORMAL
CHLORIDE SERPL-SCNC: 101 MMOL/L (ref 98–107)
COLOR UR AUTO: YELLOW
CREAT SERPL-MCNC: 0.88 MG/DL (ref 0.51–0.95)
EGFRCR SERPLBLD CKD-EPI 2021: 79 ML/MIN/1.73M2
FENTANYL UR QL: ABNORMAL
GLUCOSE SERPL-MCNC: 108 MG/DL (ref 70–99)
GLUCOSE UR STRIP-MCNC: NEGATIVE MG/DL
HCO3 SERPL-SCNC: 24 MMOL/L (ref 22–29)
HGB UR QL STRIP: NEGATIVE
KETONES UR STRIP-MCNC: NEGATIVE MG/DL
LEUKOCYTE ESTERASE UR QL STRIP: NEGATIVE
MUCOUS THREADS #/AREA URNS LPF: PRESENT /LPF
NITRATE UR QL: NEGATIVE
OPIATES UR QL SCN: ABNORMAL
PCP QUAL URINE (ROCHE): ABNORMAL
PH UR STRIP: 6.5 [PH] (ref 5–7)
POTASSIUM SERPL-SCNC: 3.8 MMOL/L (ref 3.4–5.3)
RBC URINE: 2 /HPF
SODIUM SERPL-SCNC: 138 MMOL/L (ref 135–145)
SP GR UR STRIP: 1.03 (ref 1–1.03)
SQUAMOUS EPITHELIAL: 1 /HPF
TSH SERPL DL<=0.005 MIU/L-ACNC: 2.22 UIU/ML (ref 0.3–4.2)
UROBILINOGEN UR STRIP-MCNC: 2 MG/DL
WBC URINE: 1 /HPF

## 2025-02-23 PROCEDURE — 80307 DRUG TEST PRSMV CHEM ANLYZR: CPT | Performed by: SOCIAL WORKER

## 2025-02-23 PROCEDURE — 81003 URINALYSIS AUTO W/O SCOPE: CPT | Performed by: SOCIAL WORKER

## 2025-02-23 ASSESSMENT — ACTIVITIES OF DAILY LIVING (ADL)
ADLS_ACUITY_SCORE: 41

## 2025-02-23 NOTE — ED NOTES
Registration informed of pt for discharge. Belongings handed back to pt. Pt left, wearing scrubs and without discharged papers. Charge nurse informed.
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
## 2025-02-23 NOTE — CONSULTS
DEC Consult Order placed. DEC assessment completed by Violet Patterson [facility] on 2/23 at 0114. Consult acknowledged and completed.     Cely Cerda   DEC   [phone]
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
## 2025-02-23 NOTE — ED NOTES
"Pt states she \"needs to smoke now\". RN offered pt nicotine lozenges/gum, pt declined. Pt pacing in the room, irritable. MD informed.   "

## 2025-02-23 NOTE — DISCHARGE INSTRUCTIONS
You were seen in the emergency department for concerns for health symptoms.  Your lab work appears overall reassuring.  We do not see any acute emergencies going on at this time.  You were seen by our crisis social workers and we did recommend having outpatient follow-up with them as well as with psychiatry resources, however you declined this.    Please follow-up with your primary care provider within the next 2 to 3 days.    If you start to have thoughts of hurting yourself or others, if you feel increasingly unsafe, if you have chest pain, difficulty breathing, fever, or other concerning symptoms come back to the emergency room.
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
## 2025-02-23 NOTE — ED NOTES
Clothes changed to behavioral scrubs. Clothes, shoes other personal items in belonging placed in belongings bag, labeled and secured. Warm blanket provided.

## 2025-02-24 LAB
ATRIAL RATE - MUSE: 114 BPM
DIASTOLIC BLOOD PRESSURE - MUSE: NORMAL MMHG
INTERPRETATION ECG - MUSE: NORMAL
P AXIS - MUSE: 46 DEGREES
PR INTERVAL - MUSE: 112 MS
QRS DURATION - MUSE: 72 MS
QT - MUSE: 350 MS
QTC - MUSE: 482 MS
R AXIS - MUSE: 69 DEGREES
SYSTOLIC BLOOD PRESSURE - MUSE: NORMAL MMHG
T AXIS - MUSE: 33 DEGREES
VENTRICULAR RATE- MUSE: 114 BPM